# Patient Record
Sex: FEMALE | Race: BLACK OR AFRICAN AMERICAN | NOT HISPANIC OR LATINO | Employment: UNEMPLOYED | URBAN - METROPOLITAN AREA
[De-identification: names, ages, dates, MRNs, and addresses within clinical notes are randomized per-mention and may not be internally consistent; named-entity substitution may affect disease eponyms.]

---

## 2017-08-11 ENCOUNTER — HOSPITAL ENCOUNTER (OUTPATIENT)
Dept: GENERAL RADIOLOGY | Facility: CLINIC | Age: 57
Discharge: HOME | End: 2017-08-11
Attending: EMERGENCY MEDICINE | Admitting: EMERGENCY MEDICINE
Payer: COMMERCIAL

## 2017-08-11 ENCOUNTER — HOSPITAL ENCOUNTER (OUTPATIENT)
Dept: GENERAL RADIOLOGY | Facility: CLINIC | Age: 57
End: 2017-08-11
Attending: INTERNAL MEDICINE
Payer: COMMERCIAL

## 2017-08-11 DIAGNOSIS — R52 PAIN: ICD-10-CM

## 2017-08-11 DIAGNOSIS — M25.532 WRIST PAIN, LEFT: ICD-10-CM

## 2017-08-11 PROCEDURE — 73120 X-RAY EXAM OF HAND: CPT | Mod: LT

## 2017-08-11 PROCEDURE — 73110 X-RAY EXAM OF WRIST: CPT | Mod: LT

## 2017-10-21 ENCOUNTER — HOSPITAL ENCOUNTER (EMERGENCY)
Facility: CLINIC | Age: 57
Discharge: HOME | End: 2017-10-21
Attending: EMERGENCY MEDICINE | Admitting: EMERGENCY MEDICINE
Payer: COMMERCIAL

## 2017-10-21 VITALS
DIASTOLIC BLOOD PRESSURE: 65 MMHG | SYSTOLIC BLOOD PRESSURE: 125 MMHG | OXYGEN SATURATION: 98 % | RESPIRATION RATE: 18 BRPM | TEMPERATURE: 97.4 F

## 2017-10-21 DIAGNOSIS — B02.9 HERPES ZOSTER WITHOUT COMPLICATION: ICD-10-CM

## 2017-10-21 PROCEDURE — 25000125 ZZHC RX 250: Performed by: EMERGENCY MEDICINE

## 2017-10-21 PROCEDURE — 25000132 ZZH RX MED GY IP 250 OP 250 PS 637: Performed by: EMERGENCY MEDICINE

## 2017-10-21 PROCEDURE — 99284 EMERGENCY DEPT VISIT MOD MDM: CPT | Mod: Z6 | Performed by: EMERGENCY MEDICINE

## 2017-10-21 PROCEDURE — 99283 EMERGENCY DEPT VISIT LOW MDM: CPT

## 2017-10-21 RX ORDER — ACYCLOVIR 800 MG/1
800 TABLET ORAL
Qty: 34 TABLET | Refills: 0 | Status: SHIPPED | OUTPATIENT
Start: 2017-10-21 | End: 2023-08-19

## 2017-10-21 RX ORDER — PREDNISONE 20 MG/1
40 TABLET ORAL DAILY
Qty: 8 TABLET | Refills: 0 | Status: SHIPPED | OUTPATIENT
Start: 2017-10-21 | End: 2017-10-25

## 2017-10-21 RX ORDER — PREDNISONE 20 MG/1
40 TABLET ORAL ONCE
Status: COMPLETED | OUTPATIENT
Start: 2017-10-21 | End: 2017-10-21

## 2017-10-21 RX ORDER — ACYCLOVIR 800 MG/1
800 TABLET ORAL ONCE
Status: COMPLETED | OUTPATIENT
Start: 2017-10-21 | End: 2017-10-21

## 2017-10-21 RX ADMIN — ACYCLOVIR 800 MG: 800 TABLET ORAL at 20:48

## 2017-10-21 RX ADMIN — PREDNISONE 40 MG: 20 TABLET ORAL at 20:48

## 2017-10-21 ASSESSMENT — ENCOUNTER SYMPTOMS
FEVER: 0
SHORTNESS OF BREATH: 0
ABDOMINAL PAIN: 0
CHILLS: 0
HEADACHES: 0

## 2017-10-21 NOTE — LETTER
To Whom it may concern:      Diane Chamberlain was seen in our Emergency Department today, 10/21/17.  She may return to work on October 23, 2017.    Sincerely,        Deja yKle MD

## 2017-10-21 NOTE — ED AVS SNAPSHOT
[facility], Emergency Department    [address]    [place] [address]    Phone:  [phone]    Fax:  [phone]                                       Diane Chamberlain   MRN: [id number]    Department:  [facility], Emergency Department   Date of Visit:  10/21/2017           Patient Information     Date Of Birth          1960        Your diagnoses for this visit were:     Herpes zoster without complication        You were seen by Deja Kyle MD.        Discharge Instructions       Please make an appointment to follow up with [facility] (phone: [phone]) in 2-5 days if you have any concerns.    Shingles  Shingles is a viral infection caused by the same virus as chicken pox. Anyone who has had chicken pox may get shingles later in life. The virus stays in the body, but remains dormant (asleep). Shingles often occurs in older persons or persons with lowered immunity. But it can affect anyone at any age.  Shingles starts as a tingling patch of skin on one side of the body. Small, painful blisters may then appear. The rash does not spread to the rest of the body.  Exposure to shingles cannot cause shingles. However, it can cause chicken pox in anyone who has not had chicken pox or has not been vaccinated. The contagious period ends when all blisters have crusted over (generally about 2 weeks after the illness begins).  After the blisters heal, the affected skin may be sensitive or painful for months (neuralgia). This often gradually goes away.  A shingles vaccine is available. This can help prevent shingles or make it less painful. It is generally recommended for adults over the age of 60 who have had chicken pox in the past, but who have never had shingles. Adults over 60 who have had neither chicken pox nor shingles can prevent both diseases with the chicken pox vaccine. Ask your healthcare provider about these vaccines.  Home care    Medicines may be prescribed to help  relieve pain. Take these medicines as directed. Ask your healthcare provider or pharmacist before using over-the-counter medicines for helping treat pain and itching.    In certain cases, antiviral medicines may be prescribed to reduce pain, shorten the illness, and prevent neuralgia. Take these medicines as directed.    Compresses made from a solution of cool water mixed with cornstarch or baking soda may help relieve pain and itching.     Gently wash skin daily with soap and water to help prevent infection.  Be certain to rinse off all of the soap, which can be irritating.    Trim fingernails and try not to scratch. Scratching the sores may leave scars.    Stay home from work or school until all blisters have formed a crust and you are no longer contagious.  Follow-up care  Follow up with your healthcare provider or as directed by our staff.  When to seek medical advice    Fever of 100.4 F (38 C) or higher, or as directed by your healthcare provider    Affected skin is on the face or neck and any of the following occur:    Headache    Eye pain    Changes in vision    Sores near the eye    Weakness of facial muscles    Pain, redness, or swelling of a joint    Signs of skin infection: colored drainage from the sores, warmth, increasing redness, or increasing pain  Date Last Reviewed: 9/25/2015 2000-2017 [employer]. [address] [place] [address]. All rights reserved. This information is not intended as a substitute for professional medical care. Always follow your healthcare professional's instructions.            24 Hour Appointment Hotline       To make an appointment at any [facility], call [phone] ([phone]). If you don't have a family doctor or clinic, we will help you find one. [facility] are conveniently located to serve the needs of you and your family.             Review of your medicines      START taking        Dose / Directions Last dose taken     acyclovir 800 MG tablet   Commonly known as:  ZOVIRAX   Dose:  800 mg   Indication:  Infection caused by the Varicella Zoster Virus   Quantity:  34 tablet        Take 1 tablet (800 mg) by mouth 5 times daily for 7 days   Refills:  0        predniSONE 20 MG tablet   Commonly known as:  DELTASONE   Dose:  40 mg   Quantity:  8 tablet        Take 2 tablets (40 mg) by mouth daily for 4 days   Refills:  0          Our records show that you are taking the medicines listed below. If these are incorrect, please call your family doctor or clinic.        Dose / Directions Last dose taken    ADVIL PO        Take by mouth daily as needed for moderate pain   Refills:  0        cyclobenzaprine 10 MG tablet   Commonly known as:  FLEXERIL   Dose:  5-10 mg   Quantity:  30 tablet        Take 0.5-1 tablets (5-10 mg) by mouth 3 times daily as needed for muscle spasms   Refills:  1        oxyCODONE 5 MG IR tablet   Commonly known as:  ROXICODONE   Dose:  5 mg   Quantity:  20 tablet        Take 1 tablet (5 mg) by mouth every 6 hours as needed for pain   Refills:  0                Prescriptions were sent or printed at these locations (2 Prescriptions)                   Other Prescriptions                Printed at Department/Unit printer (2 of 2)         acyclovir (ZOVIRAX) 800 MG tablet               predniSONE (DELTASONE) 20 MG tablet                Orders Needing Specimen Collection     None      Pending Results     No orders found from 10/19/2017 to 10/22/2017.            Pending Culture Results     No orders found from 10/19/2017 to 10/22/2017.            Pending Results Instructions     If you had any lab results that were not finalized at the time of your Discharge, you can call the ED Lab Result RN at [phone]. You will be contacted by this team for any positive Lab results or changes in treatment. The nurses are available 7 days a week from 10A to 6:30P.  You can leave a message 24 hours per day and they will return your  "call.        Thank you for choosing [facility]       Thank you for choosing [facility] for your care. Our goal is always to provide you with excellent care. Hearing back from our patients is one way we can continue to improve our services. Please take a few minutes to complete the written survey that you may receive in the mail after you visit with us. Thank you!        [facility] Information     [employer] lets you send messages to your doctor, view your test results, renew your prescriptions, schedule appointments and more. To sign up, go to [email or web address] . Click on \"Log in\" on the left side of the screen, which will take you to the Welcome page. Then click on \"Sign up Now\" on the right side of the page.     You will be asked to enter the access code listed below, as well as some personal information. Please follow the directions to create your username and password.     Your access code is: [email or web address]  Expires: 2018  8:27 PM     Your access code will  in 90 days. If you need help or a new code, please call your [facility] clinic or [phone].        Care EveryWhere ID     This is your Care EveryWhere ID. This could be used by other organizations to access your [facility] medical records  [id number]        Equal Access to Services     MUKESH AGUAYO : Marcio platto Sogilbert, waaxda luqadaha, qaybta kaalmada adeegyada, cristobal villeda. So [facility] [phone].    ATENCIÓN: Si habla español, tiene a harding disposición servicios gratuitos de asistencia lingüística. Llame al [phone].    We comply with applicable federal civil rights laws and [place] laws. We do not discriminate on the basis of race, color, national origin, age, disability, sex, sexual orientation, or gender identity.            After Visit Summary       This is your record. Keep this with you and show to your community pharmacist(s) and doctor(s) at your next visit.                  "

## 2017-10-22 NOTE — ED PROVIDER NOTES
History     Chief Complaint   Patient presents with     L upper extremity pain     L arm pain, radiating to armpit.      HPI  Diane Chamberlain is a 57 year old female who presents to the ED with rash in LUE and back. Patient states on Wednesday she first noticed a rash her left arm and since then several more spots have appeared. She states the rash is itchy; she denies pain.  She reports she also has a similar rash on the left side of her upper back.  No fevers.    PAST MEDICAL HISTORY  History reviewed. No pertinent past medical history.  PAST SURGICAL HISTORY  Past Surgical History:   Procedure Laterality Date      SECTION       FAMILY HISTORY  No family history on file.  SOCIAL HISTORY  Social History   Substance Use Topics     Smoking status: Never Smoker     Smokeless tobacco: Never Used     Alcohol use No     MEDICATIONS  No current facility-administered medications for this encounter.      Current Outpatient Prescriptions   Medication     Ibuprofen (ADVIL PO)     acyclovir (ZOVIRAX) 800 MG tablet     predniSONE (DELTASONE) 20 MG tablet     cyclobenzaprine (FLEXERIL) 10 MG tablet     oxyCODONE (ROXICODONE) 5 MG immediate release tablet     ALLERGIES  No Known Allergies    I have reviewed the Medications, Allergies, Past Medical and Surgical History, and Social History in the Epic system.    Review of Systems   Constitutional: Negative for chills and fever.   Respiratory: Negative for shortness of breath.    Cardiovascular: Negative for chest pain.   Gastrointestinal: Negative for abdominal pain.   Skin: Positive for rash.   Allergic/Immunologic: Negative for immunocompromised state.   Neurological: Negative for headaches.   All other systems reviewed and are negative.      Physical Exam   BP: 128/64  Heart Rate: 65  Temp: 97.4  F (36.3  C)  Resp: 18  SpO2: 99 %      Physical Exam   Constitutional: She is oriented to person, place, and time. She appears well-developed and well-nourished. No distress.    HENT:   Head: Normocephalic and atraumatic.   Mouth/Throat: Oropharynx is clear and moist. No oropharyngeal exudate.   Eyes: Conjunctivae and EOM are normal.   Neck: Normal range of motion. Neck supple.   Cardiovascular: Normal rate.    Pulmonary/Chest: Effort normal. No respiratory distress.   Musculoskeletal: Normal range of motion. She exhibits no edema, tenderness or deformity.   Lymphadenopathy:     She has no cervical adenopathy.   Neurological: She is alert and oriented to person, place, and time. No cranial nerve deficit. She exhibits normal muscle tone. Coordination normal.   Skin: Skin is warm. Rash noted. She is not diaphoretic. There is erythema.   Vesicular rash following T1/T2 dermatome starting in the upper back spreading distally on the medial, left upper extremity.   Psychiatric: She has a normal mood and affect. Her behavior is normal. Judgment and thought content normal.   Nursing note and vitals reviewed.      ED Course     ED Course     Procedures   8:09 PM  The patient was seen and examined by Dr. Kyle in Room 4.             Critical Care time:  none             Labs Ordered and Resulted from Time of ED Arrival Up to the Time of Departure from the ED - No data to display         Assessments & Plan (with Medical Decision Making)   This is a 57 year old female presenting with rash. Differential diagnosis: shingles,  molluscum contagiosum, viral illness.    After thorough history and physical examination, the patient appears to be in no acute distress. Her rash is contained in T1/T2 dermatome. This is most likely consistent with zoster. She ll be treated with oral acyclovir and oral prednisone. She ll be discharged with a prescription for the same, and she ll follow with primary care clinic. If she has any further concerns or if her symptoms worsen, she was instructed to return to the Emergency Department.     This part of the medical record was transcribed by Shanthi Lau Scribe,  from a dictation done by Deja Kyle MD.        I have reviewed the nursing notes.    I have reviewed the findings, diagnosis, plan and need for follow up with the patient.    Discharge Medication List as of 10/21/2017  8:27 PM      START taking these medications    Details   acyclovir (ZOVIRAX) 800 MG tablet Take 1 tablet (800 mg) by mouth 5 times daily for 7 days, Disp-34 tablet, R-0, Local PrintFirst dose given in the emergency department on October 21, 2017 at 8:30 PM.      predniSONE (DELTASONE) 20 MG tablet Take 2 tablets (40 mg) by mouth daily for 4 days, Disp-8 tablet, R-0, Local Print             Final diagnoses:   Herpes zoster without complication     I, Toney Gusman, am serving as a trained medical scribe to document services personally performed by Deja Kyle MD, based on the provider's statements to me.      IDeja MD, was physically present and have reviewed and verified the accuracy of this note documented by Toney Gusman.     10/21/2017   [facility], [place], EMERGENCY DEPARTMENT     Deja Kyle MD  10/21/17 0707

## 2017-10-22 NOTE — DISCHARGE INSTRUCTIONS
Please make an appointment to follow up with [facility] (phone: [phone]) in 2-5 days if you have any concerns.    Shingles  Shingles is a viral infection caused by the same virus as chicken pox. Anyone who has had chicken pox may get shingles later in life. The virus stays in the body, but remains dormant (asleep). Shingles often occurs in older persons or persons with lowered immunity. But it can affect anyone at any age.  Shingles starts as a tingling patch of skin on one side of the body. Small, painful blisters may then appear. The rash does not spread to the rest of the body.  Exposure to shingles cannot cause shingles. However, it can cause chicken pox in anyone who has not had chicken pox or has not been vaccinated. The contagious period ends when all blisters have crusted over (generally about 2 weeks after the illness begins).  After the blisters heal, the affected skin may be sensitive or painful for months (neuralgia). This often gradually goes away.  A shingles vaccine is available. This can help prevent shingles or make it less painful. It is generally recommended for adults over the age of 60 who have had chicken pox in the past, but who have never had shingles. Adults over 60 who have had neither chicken pox nor shingles can prevent both diseases with the chicken pox vaccine. Ask your healthcare provider about these vaccines.  Home care    Medicines may be prescribed to help relieve pain. Take these medicines as directed. Ask your healthcare provider or pharmacist before using over-the-counter medicines for helping treat pain and itching.    In certain cases, antiviral medicines may be prescribed to reduce pain, shorten the illness, and prevent neuralgia. Take these medicines as directed.    Compresses made from a solution of cool water mixed with cornstarch or baking soda may help relieve pain and itching.     Gently wash skin daily with soap and water to help prevent  infection.  Be certain to rinse off all of the soap, which can be irritating.    Trim fingernails and try not to scratch. Scratching the sores may leave scars.    Stay home from work or school until all blisters have formed a crust and you are no longer contagious.  Follow-up care  Follow up with your healthcare provider or as directed by our staff.  When to seek medical advice    Fever of 100.4 F (38 C) or higher, or as directed by your healthcare provider    Affected skin is on the face or neck and any of the following occur:    Headache    Eye pain    Changes in vision    Sores near the eye    Weakness of facial muscles    Pain, redness, or swelling of a joint    Signs of skin infection: colored drainage from the sores, warmth, increasing redness, or increasing pain  Date Last Reviewed: 9/25/2015 2000-2017 [employer]. [address] [place] [address]. All rights reserved. This information is not intended as a substitute for professional medical care. Always follow your healthcare professional's instructions.

## 2018-12-22 ENCOUNTER — HOSPITAL ENCOUNTER (EMERGENCY)
Facility: CLINIC | Age: 58
Discharge: HOME | End: 2018-12-22
Attending: FAMILY MEDICINE | Admitting: FAMILY MEDICINE
Payer: COMMERCIAL

## 2018-12-22 VITALS
TEMPERATURE: 97.8 F | OXYGEN SATURATION: 99 % | HEART RATE: 67 BPM | DIASTOLIC BLOOD PRESSURE: 57 MMHG | RESPIRATION RATE: 16 BRPM | WEIGHT: 214.3 LBS | SYSTOLIC BLOOD PRESSURE: 114 MMHG | BODY MASS INDEX: 39.43 KG/M2 | HEIGHT: 62 IN

## 2018-12-22 DIAGNOSIS — L29.9 ITCHING: ICD-10-CM

## 2018-12-22 PROCEDURE — 99282 EMERGENCY DEPT VISIT SF MDM: CPT | Mod: Z6 | Performed by: FAMILY MEDICINE

## 2018-12-22 PROCEDURE — 99282 EMERGENCY DEPT VISIT SF MDM: CPT | Performed by: FAMILY MEDICINE

## 2018-12-22 ASSESSMENT — MIFFLIN-ST. JEOR: SCORE: 1505.31

## 2018-12-22 NOTE — DISCHARGE INSTRUCTIONS
Use a mild facial soap such as Ivory Soap or Dove Soap to wash your face.  Avoid all facial creams and cosmetics. If you want for dry skin- you may use Eucerin or Lubriderm.  For the itch, you may try benadryl orally. You may try 25 mg every 6 hours. Benadryl(diphenhydramine) is over the counter medication and does not need a prescription. The generic works well.  You may follow up at your local medical clinic if not better

## 2018-12-22 NOTE — ED AVS SNAPSHOT
[facility], Emergency Department  [address]  [facility] [address]  Phone:  [phone]  Fax:  [phone]                                    Diane Chamberlain   MRN: [id number]    Department:  [facility], Emergency Department   Date of Visit:  12/22/2018           After Visit Summary Signature Page    I have received my discharge instructions, and my questions have been answered. I have discussed any challenges I see with this plan with the nurse or doctor.    ..........................................................................................................................................  Patient/Patient Representative Signature      ..........................................................................................................................................  Patient Representative Print Name and Relationship to Patient    ..................................................               ................................................  Date                                   Time    ..........................................................................................................................................  Reviewed by Signature/Title    ...................................................              ..............................................  Date                                               Time          22EPIC Rev 08/18

## 2018-12-25 ASSESSMENT — ENCOUNTER SYMPTOMS
SORE THROAT: 0
FATIGUE: 0
HEMATOLOGIC/LYMPHATIC NEGATIVE: 1
CHILLS: 0
COLOR CHANGE: 0
MYALGIAS: 0
FEVER: 0

## 2018-12-25 NOTE — ED PROVIDER NOTES
"  History     Chief Complaint   Patient presents with     Pruritis     Thinks she has allergies.  eyes and ears feel itchy for about 1 weeks.  Face swollen for 2 weeks.  Has not taken any benadryl.     HPI  Diane Chamberlain is a 58 year old female who has no medical ongoing problems. Her stated age is only 47 not 58. She has had itching of the face and neck for the last 2 weeks. She is here for evaluation.  No rash just pruritis. No f or c. No n or v. Denies pregnancy. She does not believe she has used any new or different creams  I have reviewed the Medications, Allergies, Past Medical and Surgical History, and Social History in the Epic system.  No street drugs or medications. No prescription medications.  Review of Systems   Constitutional: Negative for chills, fatigue and fever.   HENT: Negative for congestion and sore throat.    Genitourinary:        Denies preg   Musculoskeletal: Negative for myalgias.   Skin: Negative for color change and rash.   Allergic/Immunologic: Negative for immunocompromised state.   Hematological: Negative.        Physical Exam   BP: 114/57  Pulse: 67  Temp: 97.8  F (36.6  C)  Resp: 16  Height: 157.5 cm (5' 2\")  Weight: 97.2 kg (214 lb 4.8 oz)  SpO2: 99 %      Physical Exam   Constitutional: She appears well-developed and well-nourished. No distress.   Eyes: No scleral icterus.   No scleral icterus   Cardiovascular: Normal rate and regular rhythm.   Pulmonary/Chest: No respiratory distress.   Lymphadenopathy:     She has no cervical adenopathy.   Skin: Skin is warm and dry. No rash noted. She is not diaphoretic. No erythema. No pallor.   Nursing note and vitals reviewed.      ED Course        Procedures        Facial itching. With no rash. No itching other than face. Suspect something topical is the cause  Labs Ordered and Resulted from Time of ED Arrival Up to the Time of Departure from the ED - No data to display         Assessments & Plan (with Medical Decision Making)       I have " reviewed the nursing notes.    I have reviewed the findings, diagnosis, plan and need for follow up with the patient.       Medication List      There are no discharge medications for this visit.         Final diagnoses:   Itching - of face for 2 weeks       12/22/2018   [facility], [place], EMERGENCY DEPARTMENT     Sebastian Callaway MD  12/25/18 8730

## 2019-05-19 ENCOUNTER — HOSPITAL ENCOUNTER (EMERGENCY)
Facility: CLINIC | Age: 59
Discharge: HOME | End: 2019-05-19
Attending: EMERGENCY MEDICINE | Admitting: EMERGENCY MEDICINE
Payer: COMMERCIAL

## 2019-05-19 VITALS
RESPIRATION RATE: 14 BRPM | WEIGHT: 207 LBS | SYSTOLIC BLOOD PRESSURE: 124 MMHG | DIASTOLIC BLOOD PRESSURE: 70 MMHG | OXYGEN SATURATION: 100 % | BODY MASS INDEX: 37.86 KG/M2 | HEART RATE: 74 BPM

## 2019-05-19 DIAGNOSIS — S09.90XA CLOSED HEAD INJURY, INITIAL ENCOUNTER: ICD-10-CM

## 2019-05-19 PROCEDURE — 99282 EMERGENCY DEPT VISIT SF MDM: CPT | Mod: Z6 | Performed by: EMERGENCY MEDICINE

## 2019-05-19 PROCEDURE — 99282 EMERGENCY DEPT VISIT SF MDM: CPT | Performed by: EMERGENCY MEDICINE

## 2019-05-19 RX ORDER — ACETAMINOPHEN 500 MG
1000 TABLET ORAL EVERY 6 HOURS PRN
Qty: 32 TABLET | Refills: 0 | Status: SHIPPED | OUTPATIENT
Start: 2019-05-19 | End: 2019-05-23

## 2019-05-19 ASSESSMENT — ENCOUNTER SYMPTOMS
WEAKNESS: 0
NECK PAIN: 0
VOMITING: 0
LIGHT-HEADEDNESS: 0
NUMBNESS: 0
DIZZINESS: 0
HEADACHES: 1
NAUSEA: 0

## 2019-05-19 NOTE — LETTER
May 19, 2019      To Whom It May Concern:      Diane Chamberlain was seen in our Emergency Department today, 05/19/19.  She may return to work tomorrow.     Sincerely,            Eva Drummond MD

## 2019-05-19 NOTE — ED PROVIDER NOTES
History     Chief Complaint   Patient presents with     Head Injury     yesterday, pt fell at her job and hit left forehead     The history is provided by the patient. The history is limited by a language barrier. A  was used (E[place]ian iPad ).     Diane Chamberlain is a 59 year old Egyptian female who presents to the Emergency Department for evaluation of an occupational head injury following a mechanical fall two days ago. [place] and states that while there two day ago she tripped and fell forward subsequently striking her left forehead against the floor. She denies any loss of consciousness following this, and denies feeling light-headed or dizzy prior to the fall. Furthermore, patient states that several hours following the initial injury, she sustained a repeat injury to the same location while she was sweeping and the broom fell and struck her forehead. Patient currently complains of a severe headache so much so that she was unable to return to work today. She denies sustaining any other injuries related to either of these events. She has attempted icing the site but otherwise denies any additional over-the-counter therapies. She denies any nausea, emesis, numbness, paresthesias, weakness, vision changes, neck pain, or other complaints. She does report feeling dizzy when walking.  She denies previous injuries of the same area. Of note, patient is currently fasting for Ramadan. She denies any current chance of pregnancy, and states that she has not menstruated in over two months.        I have reviewed the Medications, Allergies, Past Medical and Surgical History, and Social History in the Epic system.    No past medical history on file.    Past Surgical History:   Procedure Laterality Date      SECTION         No family history on file.    Social History     Tobacco Use     Smoking status: Never Smoker     Smokeless tobacco: Never  Used   Substance Use Topics     Alcohol use: No     No current facility-administered medications for this encounter.      Current Outpatient Medications   Medication     acyclovir (ZOVIRAX) 800 MG tablet     cyclobenzaprine (FLEXERIL) 10 MG tablet     Ibuprofen (ADVIL PO)     oxyCODONE (ROXICODONE) 5 MG immediate release tablet     UNKNOWN TO PATIENT      No Known Allergies    Review of Systems   Eyes: Negative for visual disturbance.   Gastrointestinal: Negative for nausea and vomiting.   Musculoskeletal: Negative for gait problem and neck pain.   Neurological: Positive for headaches. Negative for dizziness, syncope, weakness, light-headedness and numbness.   All other systems reviewed and are negative.      Physical Exam        /70   Pulse 74   Resp 14   Wt 93.9 kg (207 lb)   SpO2 100%   BMI 37.86 kg/m      Physical Exam  General: patient is alert and oriented, moaning and uncomfortable appearing  Head: left frontal scalp hematoma   EENT: moist mucus membranes without tonsillar erythema or exudates, pupils 3mm equal round and reactive, EOMI, no hemotympanum bilaterally  Neck: supple with full range of motion, no midline cervical spine tenderness to palpation  Cardiovascular: regular rate and rhythm, extremities warm and well perfused, no lower extremity edema  Pulmonary: No respiratory distress   musculoskeletal: normal range of motion of the extremities  Neurological: alert and oriented, moving all extremities symmetrically, CN II-XII intact, strength 5/5 and symmetric in , hip flexion/extension, knee flexion/extension and ankle plantar/dorsiflexion, sensation to light touch in distal upper and lower extremities intact, normal gait  Skin: warm, dry     ED Course        Procedures             Critical Care time:  none             Labs Ordered and Resulted from Time of ED Arrival Up to the Time of Departure from the ED - No data to display         Assessments & Plan (with Medical Decision Making)    This is a 59 year old female who appears younger than her stated age and presents to the Emergency Department following a mechanical fall and head injury. The patient reports mechanical fall two days ago at which time she struck her forehead with no loss of consciousness. She subsequently had a second injury when a broom fell and hit her head at the same location. Currently she is neurovascularly intact but does have palpable scalp hematoma on exam. She does report her headache is progressively worsening and very severe at this time.  She is currently fasting for Ramadan and has declined any oral treatment for her headache. Head CT ordered however patient then reported she is fasting and does not want to do the CT and wants to go home and come back later.  We discussed that the CT would not involve any fluids or break her fast however she is continued to decline.  She is aware of the risks of not completing including but not limited to fracture, ICH, other morbidity and mortality.  She does have capacity to make the decision.  Encouraged to return to the emergency department at any time and also instructed on red flag symptoms to be aware of at home and to immediately return if she should have any worsening symptoms.    This part of the medical record was transcribed by Gaye Gonzalez, Medical Scribe, from a dictation done by Eva Drummond MD.       I have reviewed the nursing notes.    I have reviewed the findings, diagnosis, plan and need for follow up with the patient.       Medication List      Started    acetaminophen 500 MG tablet  Commonly known as:  TYLENOL  1,000 mg, Oral, EVERY 6 HOURS PRN            Final diagnoses:   Closed head injury, initial encounter     IGaye, am serving as a trained medical scribe to document services personally performed by Eva Pickens MD, based on the provider's statements to me.   I, Eva Pickens MD, was physically present and have reviewed and verified the accuracy of  this note documented by Gaye Gonzalez.    5/19/2019   [facility], [place], EMERGENCY DEPARTMENT     Eva Drummond MD  05/19/19 5828

## 2019-05-19 NOTE — ED NOTES
Pt does not want to get a CT at this time. Have spoken to the pt with the .The MD has spoken to the pt. Pt will be DC'd

## 2019-05-19 NOTE — ED AVS SNAPSHOT
[facility], [place], Emergency Department  [address]  [facility] [address]  Phone:  [phone]  Fax:  [phone]                                    Diane Chamberlain   MRN: [id number]    Department:  [facility], Emergency Department   Date of Visit:  5/19/2019           After Visit Summary Signature Page    I have received my discharge instructions, and my questions have been answered. I have discussed any challenges I see with this plan with the nurse or doctor.    ..........................................................................................................................................  Patient/Patient Representative Signature      ..........................................................................................................................................  Patient Representative Print Name and Relationship to Patient    ..................................................               ................................................  Date                                   Time    ..........................................................................................................................................  Reviewed by Signature/Title    ...................................................              ..............................................  Date                                               Time          22EPIC Rev 08/18

## 2019-05-19 NOTE — DISCHARGE INSTRUCTIONS
You were seen in the emergency department for a head injury.  It was recommended that you have a CT of the head to rule out injury but you have declined.  The risks of this include but are not limited to: skull fracture, bleeding inside your head, stroke, death.  You may return at any time for re-evaluation.

## 2019-08-02 ENCOUNTER — HOSPITAL ENCOUNTER (OUTPATIENT)
Dept: CT IMAGING | Facility: CLINIC | Age: 59
Discharge: HOME | End: 2019-08-02
Attending: NURSE PRACTITIONER | Admitting: NURSE PRACTITIONER
Payer: COMMERCIAL

## 2019-08-02 DIAGNOSIS — R51.9 ACUTE NONINTRACTABLE HEADACHE, UNSPECIFIED HEADACHE TYPE: ICD-10-CM

## 2019-08-02 PROCEDURE — 70450 CT HEAD/BRAIN W/O DYE: CPT

## 2021-06-05 ENCOUNTER — APPOINTMENT (OUTPATIENT)
Dept: GENERAL RADIOLOGY | Facility: CLINIC | Age: 61
End: 2021-06-05
Attending: EMERGENCY MEDICINE
Payer: COMMERCIAL

## 2021-06-05 ENCOUNTER — HOSPITAL ENCOUNTER (EMERGENCY)
Facility: CLINIC | Age: 61
Discharge: HOME | End: 2021-06-05
Attending: EMERGENCY MEDICINE | Admitting: EMERGENCY MEDICINE
Payer: COMMERCIAL

## 2021-06-05 VITALS
HEART RATE: 73 BPM | SYSTOLIC BLOOD PRESSURE: 143 MMHG | RESPIRATION RATE: 16 BRPM | TEMPERATURE: 97.1 F | DIASTOLIC BLOOD PRESSURE: 70 MMHG | OXYGEN SATURATION: 98 %

## 2021-06-05 DIAGNOSIS — S96.911A STRAIN OF RIGHT FOOT, INITIAL ENCOUNTER: ICD-10-CM

## 2021-06-05 PROCEDURE — 250N000013 HC RX MED GY IP 250 OP 250 PS 637: Performed by: EMERGENCY MEDICINE

## 2021-06-05 PROCEDURE — 99283 EMERGENCY DEPT VISIT LOW MDM: CPT | Performed by: EMERGENCY MEDICINE

## 2021-06-05 PROCEDURE — 99284 EMERGENCY DEPT VISIT MOD MDM: CPT | Performed by: EMERGENCY MEDICINE

## 2021-06-05 PROCEDURE — 73630 X-RAY EXAM OF FOOT: CPT | Mod: RT

## 2021-06-05 RX ORDER — IBUPROFEN 600 MG/1
600 TABLET, FILM COATED ORAL ONCE
Status: COMPLETED | OUTPATIENT
Start: 2021-06-05 | End: 2021-06-05

## 2021-06-05 RX ADMIN — IBUPROFEN 600 MG: 600 TABLET, FILM COATED ORAL at 13:59

## 2021-06-05 ASSESSMENT — ENCOUNTER SYMPTOMS
CONFUSION: 0
DIFFICULTY URINATING: 0
SHORTNESS OF BREATH: 0
HEADACHES: 0
FEVER: 0
ABDOMINAL PAIN: 0
NECK STIFFNESS: 0
EYE REDNESS: 0
ARTHRALGIAS: 0
COLOR CHANGE: 0

## 2021-06-05 NOTE — ED PROVIDER NOTES
[facility] EMERGENCY DEPARTMENT ([facility])  21     History     Chief Complaint   Patient presents with     Foot Pain     Right foot pain after falling yesterday      The history is provided by the patient and medical records.     Diane Chamberlain is a 61 year old female with no significant past medical history who presents here to the Emergency Department due to right foot pain.  Patient reports yesterday she tripped and fell in her house causing her to hyperextend her right foot.  She now endorses pain and swelling on the top of her right foot.  She is still able to walk on her foot.  She states she has taken some ibuprofen for her pain.  Patient denies other injury.    Past Medical History  No past medical history on file.  Past Surgical History:   Procedure Laterality Date      SECTION       acyclovir (ZOVIRAX) 800 MG tablet  cyclobenzaprine (FLEXERIL) 10 MG tablet  oxyCODONE (ROXICODONE) 5 MG immediate release tablet  UNKNOWN TO PATIENT      No Known Allergies  Family History  No family history on file.  Social History   Social History     Tobacco Use     Smoking status: Never Smoker     Smokeless tobacco: Never Used   Substance Use Topics     Alcohol use: No     Drug use: No      Past medical history, past surgical history, medications, allergies, family history, and social history were reviewed with the patient. No additional pertinent items.       Review of Systems   Constitutional: Negative for fever.   HENT: Negative for congestion.    Eyes: Negative for redness.   Respiratory: Negative for shortness of breath.    Cardiovascular: Negative for chest pain.   Gastrointestinal: Negative for abdominal pain.   Genitourinary: Negative for difficulty urinating.   Musculoskeletal: Negative for arthralgias and neck stiffness.        Pos for anterior right foot pain   Skin: Negative for color change.   Neurological: Negative for headaches.   Psychiatric/Behavioral: Negative for confusion.   All  other systems reviewed and are negative.    A complete review of systems was performed with pertinent positives and negatives noted in the HPI, and all other systems negative.    Physical Exam   BP: (!) 143/70  Pulse: 73  Temp: 97.1  F (36.2  C)  Resp: 16  SpO2: 98 %  Physical Exam  Constitutional:       General: She is not in acute distress.     Appearance: She is not diaphoretic.   HENT:      Head: Atraumatic.      Mouth/Throat:      Pharynx: No oropharyngeal exudate.   Eyes:      General: No scleral icterus.     Pupils: Pupils are equal, round, and reactive to light.   Cardiovascular:      Heart sounds: Normal heart sounds.   Pulmonary:      Effort: No respiratory distress.      Breath sounds: Normal breath sounds.   Abdominal:      General: Bowel sounds are normal.      Palpations: Abdomen is soft.      Tenderness: There is no abdominal tenderness.   Musculoskeletal:      Right foot: Tenderness and bony tenderness present.        Feet:    Skin:     General: Skin is warm.      Findings: No rash.         ED Course     1:49 PM  The patient was seen and examined by Reuben Obando MD in Room ED06.    Procedures        The medical record was reviewed and interpreted.  Current images reviewed and interpreted: No obvious fractures.       Results for orders placed or performed during the hospital encounter of 06/05/21   Foot  XR, G/E 3 views, right     Status: None    Narrative    EXAM: XR FOOT RIGHT G/E 3 VIEWS  LOCATION: [facility]  DATE/TIME: 6/5/2021 2:39 PM    INDICATION: Injury, pain  COMPARISON: None.      Impression    IMPRESSION: Normal joint spaces and alignment. There is a tiny calcification or cortical bone fragment adjacent distal tip of the distal phalanx of the fifth toe which is age indeterminate but more likely chronic provided there is no associated   tenderness. No other fracture. Mild plantar calcaneal spur.     Medications   ibuprofen (ADVIL/MOTRIN) tablet 600 mg (600 mg Oral Given  6/5/21 2007)        Assessments & Plan (with Medical Decision Making)   61-year-old female presents for evaluation of right foot pain following injury.  Differential includes fracture, sprain, Lisfranc, contusion, metatarsalgia, plantar fasciitis.  Exam reveals tenderness over the second through fourth metatarsals without significant bruising or swelling.  X-ray reveals no acute bony abnormalities.  Patient will be discharged with postop boot, ibuprofen and instructions to follow-up with a primary physician in the next 1 to 2 weeks if her symptoms have not fully resolved.    I have reviewed the nursing notes. I have reviewed the findings, diagnosis, plan and need for follow up with the patient.    New Prescriptions    No medications on file       Final diagnoses:   Strain of right foot, initial encounter   I, Kassy Solis, am serving as a trained medical scribe to document services personally performed by Reuben Obando MD, based on the provider's statements to me.     I, Reuben Obando MD, was physically present and have reviewed and verified the accuracy of this note documented by Kassy Solis.     --  Reuben Obando MD  [facility] EMERGENCY DEPARTMENT  6/5/2021     Reuben Obando MD  06/05/21 2537

## 2021-06-05 NOTE — ED NOTES
This RN went to discharge patient and take vital signs, patient was not found in room. Both bathrooms were checked and patient was not found in either bathrooms. Upon going to the front lobby, registration stated they had let the patient out as the patient said she was discharged. Unable to contact patient by phone at this time to go over discharge paperwork.

## 2021-06-08 RX ORDER — IBUPROFEN 600 MG/1
600 TABLET, FILM COATED ORAL EVERY 6 HOURS PRN
Qty: 20 TABLET | Refills: 0 | Status: SHIPPED | OUTPATIENT
Start: 2021-06-08 | End: 2023-08-19

## 2021-06-08 NOTE — ED NOTES
Pt calling due to not receiving prescription for ibuprofen. Dr. Obando informed and will send prescription to [facility].

## 2021-07-14 ENCOUNTER — APPOINTMENT (OUTPATIENT)
Dept: GENERAL RADIOLOGY | Facility: CLINIC | Age: 61
End: 2021-07-14
Attending: EMERGENCY MEDICINE
Payer: COMMERCIAL

## 2021-07-14 ENCOUNTER — HOSPITAL ENCOUNTER (EMERGENCY)
Facility: CLINIC | Age: 61
Discharge: HOME | End: 2021-07-14
Attending: EMERGENCY MEDICINE | Admitting: EMERGENCY MEDICINE
Payer: COMMERCIAL

## 2021-07-14 VITALS
HEART RATE: 85 BPM | RESPIRATION RATE: 16 BRPM | SYSTOLIC BLOOD PRESSURE: 138 MMHG | TEMPERATURE: 97.9 F | DIASTOLIC BLOOD PRESSURE: 77 MMHG | OXYGEN SATURATION: 99 %

## 2021-07-14 DIAGNOSIS — M79.671 RIGHT FOOT PAIN: ICD-10-CM

## 2021-07-14 PROCEDURE — 99283 EMERGENCY DEPT VISIT LOW MDM: CPT | Performed by: EMERGENCY MEDICINE

## 2021-07-14 PROCEDURE — 99284 EMERGENCY DEPT VISIT MOD MDM: CPT | Performed by: EMERGENCY MEDICINE

## 2021-07-14 PROCEDURE — 73630 X-RAY EXAM OF FOOT: CPT | Mod: RT

## 2021-07-14 RX ORDER — NAPROXEN 500 MG/1
500 TABLET ORAL 2 TIMES DAILY PRN
Qty: 24 TABLET | Refills: 0 | Status: SHIPPED | OUTPATIENT
Start: 2021-07-14 | End: 2023-08-19

## 2021-07-14 ASSESSMENT — ENCOUNTER SYMPTOMS: ABDOMINAL PAIN: 0

## 2021-07-14 NOTE — ED PROVIDER NOTES
[facility] EMERGENCY DEPARTMENT ([facility])  21  History     Chief Complaint   Patient presents with     Foot Pain     Tripped about 3 weeks ago, hyperextended right foot, came to ED at that time and was given a boot. Pt reports pain is still there and wants to be seen again.      The history is provided by the patient and medical records. A  was used (Family member speaker phone Grenadian ).     Diane Chamberlain is a 61 year old female with a past medical history significant for primary osteoarthritis of right knee, GERD who presents to the Emergency Department for evaluation of right foot pain.  Patient reports that she tripped approximately 3 weeks ago (was actually ) and hyperextended her right foot.  She states that she came to the ED at that time and was given a boot.  Patient reports that the pain is causing her to not sleep at night. SHe doesn't like wearing the boot that she was given because it has been hot ourside, so she has not been wearing it.  She is instead wearing a hard soled shoe. Patient reports that the pain is overlying the distal 2nd and 3rd metatarsals on both the dorsal and plantar aspect of the foot.  Patient reports that it hurts more when she walks and puts weight on it.  She states that she feels heat on her foot.  She denies any new injury or trauma.  Patient reports that she takes ibuprofen for the pain. Patient denies chest pain, abdominal pain.  No numbness or weakness of the toes or the foot.  She has no other complaints today.      Past Medical History  No past medical history on file.  Past Surgical History:   Procedure Laterality Date      SECTION       naproxen (NAPROSYN) 500 MG tablet  acyclovir (ZOVIRAX) 800 MG tablet  cyclobenzaprine (FLEXERIL) 10 MG tablet  ibuprofen (ADVIL/MOTRIN) 600 MG tablet  oxyCODONE (ROXICODONE) 5 MG immediate release tablet  UNKNOWN TO PATIENT      No Known Allergies  Family History  No family  history on file.  Social History   Social History     Tobacco Use     Smoking status: Never Smoker     Smokeless tobacco: Never Used   Substance Use Topics     Alcohol use: No     Drug use: No      Past medical history, past surgical history, medications, allergies, family history, and social history were reviewed with the patient. No additional pertinent items.       Review of Systems   Cardiovascular: Negative for chest pain.   Gastrointestinal: Negative for abdominal pain.   Musculoskeletal:        Positive for right foot pain   All other systems reviewed and are negative.      Physical Exam   BP: (!) 141/73  Pulse: 80  Temp: 97.5  F (36.4  C)  Resp: 16  SpO2: 98 %  Physical Exam    GEN:  Alert, well developed, no acute distress  HEENT:  PERRL, EOMI, Mucous membranes are moist.   Musculoskeletal: The right foot and toes of the right foot have normal range of motion, there is no swelling, ecchymosis, erythema or focal tenderness.  Normal distal capillary refill, normal dorsalis pedis pulse on the right foot.  No tenderness in the ankle.  No lower extremity swelling or calf tenderness  Neuro:  Alert and oriented X3, Follows commands, moving all extremities spontaneously, normal strength and sensation throughout the right foot and ankle, normal strength with dorsiflexion and plantarflexion of the toes of the right foot.  Skin:  Warm, dry   ED Course     3:17 PM  The patient was seen and examined by Jazmine Schmitt MD in Room ED06.   Procedures    Repeat x-ray of the right foot was reviewed by me and results are shown below.     Results for orders placed or performed during the hospital encounter of 07/14/21   Foot  XR, G/E 3 views, right     Status: None (Preliminary result)    Narrative    FOOT RIGHT THREE OR MORE VIEWS   7/14/2021 4:26 PM    HISTORY: Foot pain after injury 1 month ago.    COMPARISON: 6/5/2021       Impression    IMPRESSION:  No acute change. No new right foot fracture or dislocation.  Unchanged  mild right great toe MTP joint osteoarthritis. Tiny bone fragment seen  at the distal tip of the right small toe distal phalanx, unchanged.  Heel spur.      Medications - No data to display     Assessments & Plan (with Medical Decision Making)   Patient presents with continued pain in her right foot after she hyperflexed it about a month ago.  She stopped wearing the boot and is wearing a hard soled shoe.  I advised that she go back to wearing the boot because then this will put less pressure on, less use of the extensor tendons in the foot.  Patient was also given a note with walking and standing restrictions at work.  She was advised to try taking naproxen instead of ibuprofen and was advised to follow-up with her primary care provider in 1 to 2 weeks for continued care and reevaluation.  There is no sign of neurovascular compromise or any sign of infection.    I have reviewed the nursing notes. I have reviewed the findings, diagnosis, plan and need for follow up with the patient.    Discharge Medication List as of 7/14/2021  5:11 PM      START taking these medications    Details   naproxen (NAPROSYN) 500 MG tablet Take 1 tablet (500 mg) by mouth 2 times daily as needed for moderate pain, Disp-24 tablet, R-0, Local Print             Final diagnoses:   Right foot pain     I, Allyson Huff, am serving as a trained medical scribe to document services personally performed by Jazmine Schmitt MD, based on the provider's statements to me.     I, Jazmine Schmitt MD, was physically present and have reviewed and verified the accuracy of this note documented by Allyson Huff.    --  Jazmine Schmitt MD  [facility] EMERGENCY DEPARTMENT  7/14/2021     Jazmine Schmitt MD  07/14/21 9162

## 2021-07-14 NOTE — LETTER
July 14, 2021      To Whom It May Concern:      Diane Chamberlain was seen in our Emergency Department today, 07/14/21.  She may return to work/school tomorrow, but will need to restrict standing to 10-15 minutes at a time, restrict walking to 2 blocks at a time and minimize stair climbing for the foot to heal. She will need to follow up with her primary care provider for re-evaluation in 1-2 weeks.     Sincerely,        Jazmine Schmitt MD

## 2021-07-14 NOTE — DISCHARGE INSTRUCTIONS
Please wear the boot that was given to you when you are discharged for your foot injury last month.  This will prevent further pain.  Please return the emergency department if you have numbness or weakness in the foot, redness, increased pain or swelling, any other concerns.    Please make an appointment to follow up with Your Primary Care Provider in 7 days for reevaluation and to determine further work restrictions or to determine your ability to return to normal working activities.

## 2021-07-19 ENCOUNTER — MEDICAL CORRESPONDENCE (OUTPATIENT)
Dept: HEALTH INFORMATION MANAGEMENT | Facility: CLINIC | Age: 61
End: 2021-07-19

## 2021-07-23 NOTE — TELEPHONE ENCOUNTER
DIAGNOSIS: ER Follow Up:  RIGHT foot / Reuben Obando, Emergency Medicine @ [facility] / Imaging ON FILE   APPOINTMENT DATE: 7.30.21   NOTES STATUS DETAILS   OFFICE NOTE from referring provider Internal 7.14.21 Oskar JEMAL    OFFICE NOTE from other specialist N/A    DISCHARGE SUMMARY from hospital N/A    DISCHARGE REPORT from the ER Internal 7.14.21 MILLI Schmitt  6.5.21 [facility]   OPERATIVE REPORT N/A    EMG report N/A    MEDICATION LIST Internal    MRI N/A    DEXA (osteoporosis/bone health) N/A    CT SCAN N/A    XRAYS (IMAGES & REPORTS) Internal 7.14.21 R foot  6.5.21 R foot

## 2021-07-28 ENCOUNTER — TRANSCRIBE ORDERS (OUTPATIENT)
Dept: OTHER | Age: 61
End: 2021-07-28

## 2021-07-28 DIAGNOSIS — M79.671 RIGHT FOOT PAIN: Primary | ICD-10-CM

## 2021-07-30 ENCOUNTER — PRE VISIT (OUTPATIENT)
Dept: ORTHOPEDICS | Facility: CLINIC | Age: 61
End: 2021-07-30

## 2021-08-25 ENCOUNTER — OFFICE VISIT (OUTPATIENT)
Dept: ORTHOPEDICS | Facility: CLINIC | Age: 61
End: 2021-08-25
Payer: COMMERCIAL

## 2021-08-25 DIAGNOSIS — M79.671 FOOT PAIN, RIGHT: Primary | ICD-10-CM

## 2021-08-25 PROCEDURE — 99203 OFFICE O/P NEW LOW 30 MIN: CPT | Performed by: FAMILY MEDICINE

## 2021-08-25 NOTE — PROGRESS NOTES
Subjective:   Diane Chamberlain is a 61 year old female who presents in clinic for evaluation of right foot along 1st and 5th metatarsal and along all 5 metatarsal heads. She is wearing a surgical shoe in clinic today.   Pt reports she had an injury, tripped on a rug  Landed face down  Toes, under the foot under toes  Had x-rays in  and July with small bone fragment  If gets out of postop shoe it's more painful  No past foot injuries  Severe pain, worse 2nd and 3rd metatarsal heads  No back pain, sometimes numbness into right foot  Active at her job, sometimes walks at home  No PT done  Pain decreases somewhat with Naproxen  [place] [employer], hard to land off the van  Wants time off from work    Background:   Date of injury: 21   Duration of symptoms: 2.5 months  Mechanism of Injury: Acute; Activity Related, fell in her home causing toes and foot to hyperextend.   Intensity: 5/10 at rest, 8/10 with activity   Aggravating factors: weight-bearing activities, wearing closed toe shoes.   Relieving Factors: rest, Advil and naproxen.   Prior Evaluation: Prior Physician Evalutation: 21 at ED and X-rays 21 and 21    PAST MEDICAL, SOCIAL, SURGICAL AND FAMILY HISTORY: She  has no past medical history on file.  She  has a past surgical history that includes  section.  Her family history is not on file.  She reports that she has never smoked. She has never used smokeless tobacco. She reports that she does not drink alcohol and does not use drugs.    ALLERGIES: She has No Known Allergies.    CURRENT MEDICATIONS: She has a current medication list which includes the following prescription(s): acyclovir, cyclobenzaprine, ibuprofen, naproxen, oxycodone, and UNKNOWN TO PATIENT.     REVIEW OF SYSTEMS: 10 point review of systems is negative except as noted above.     Exam:   There were no vitals taken for this visit.           CONSTITUTIONAL: alert, moderate distress, cooperative and over  weight  HEAD: Normocephalic. No masses, lesions, tenderness or abnormalities  SKIN: no suspicious lesions or rashes  GAIT: antalgic  NEUROLOGIC: Non-focal  PSYCHIATRIC: affect normal/bright    MUSCULOSKELETAL: right foot pain    ANKLE  Inspection/Palpation:     Swelling: no swelling      Non-tender: ATFL, CFL, PTFL, medial malleolus, deltoid ligament, anterior tib-fib ligament, achilles  tendon, no achilles tendon defect   Range of Motion: dorsiflexion: full, plantarflexion: full, inversion: full, eversion: full  Strength:dorsiflexion: 5/5, plantarflexion: 5/5, inversion: 5/5, eversion: 5/5   Special tests: negative anterior drawer, negative varus stress, negative valgus stress, negative forced external rotation, negative Anderson sign     FOOT  Inspection/Palpation:      Swelling: no swelling  Tender capsules of 2nd and 3rd MTP     Non-tender: promixal 5th metatarsal, 1st, 4th, 5th metatarsals, calcaneous, cuboid,  navicular, cuneiform lateral, cuneiform middle, cuneiform medial, metatarsal heads, peroneal tendon: at lateral malleolus, at cuboid, at proximal 5th metatarsal, posterior tibial tendon at medial malleolus, posterior tibial tendon at navicular, plantar fascia  Range of Motion: flexion of toes: full, extension of toes: full         Assessment/Plan:   Pt is a 60 yo Slovak female with PMhx of no medical issues presenting with right foot pain    1.  Right foot second and third metatarsal capsulitis-  Super feet with metatarsal pads  Patient can use this in regular shoes and stop using the postop boot on the right foot  This will be used to calm the area  Ice  Patient also sent to physical therapy as she has been using a postop shoe for 2-1/2 months  Patient reports that it is difficult to do her job because when she steps down from a higher step she is putting increased pressure on the right foot  I did give a letter reporting that she was receiving orthotics and that physical therapy was recommended however  I feel that the patient should have a discussion regarding further time off with her employer    RTC prn, if no improvement s/p PT  X-RAY INTERPRETATION:   Right foot reviewed x-ray- mild MTP OA, tiny bone fragment distal tip of right small toe distal phalanx

## 2021-08-25 NOTE — LETTER
2021      RE: Diane Chamberlain  [address]  [facility] [address]        Subjective:   Diane Chamberlain is a 61 year old female who presents in clinic for evaluation of right foot along 1st and 5th metatarsal and along all 5 metatarsal heads. She is wearing a surgical shoe in clinic today.   Pt reports she had an injury, tripped on a rug  Landed face down  Toes, under the foot under toes  Had x-rays in  and July with small bone fragment  If gets out of postop shoe it's more painful  No past foot injuries  Severe pain, worse 2nd and 3rd metatarsal heads  No back pain, sometimes numbness into right foot  Active at her job, sometimes walks at home  No PT done  Pain decreases somewhat with Naproxen  [place] [employer], hard to land off the van  Wants time off from work    Background:   Date of injury: 21   Duration of symptoms: 2.5 months  Mechanism of Injury: Acute; Activity Related, fell in her home causing toes and foot to hyperextend.   Intensity: 5/10 at rest, 8/10 with activity   Aggravating factors: weight-bearing activities, wearing closed toe shoes.   Relieving Factors: rest, Advil and naproxen.   Prior Evaluation: Prior Physician Evalutation: 21 at ED and X-rays 21 and 21    PAST MEDICAL, SOCIAL, SURGICAL AND FAMILY HISTORY: She  has no past medical history on file.  She  has a past surgical history that includes  section.  Her family history is not on file.  She reports that she has never smoked. She has never used smokeless tobacco. She reports that she does not drink alcohol and does not use drugs.    ALLERGIES: She has No Known Allergies.    CURRENT MEDICATIONS: She has a current medication list which includes the following prescription(s): acyclovir, cyclobenzaprine, ibuprofen, naproxen, oxycodone, and UNKNOWN TO PATIENT.     REVIEW OF SYSTEMS: 10 point review of systems is negative except as noted above.     Exam:   There were no vitals taken for  this visit.           CONSTITUTIONAL: alert, moderate distress, cooperative and over weight  HEAD: Normocephalic. No masses, lesions, tenderness or abnormalities  SKIN: no suspicious lesions or rashes  GAIT: antalgic  NEUROLOGIC: Non-focal  PSYCHIATRIC: affect normal/bright    MUSCULOSKELETAL: right foot pain    ANKLE  Inspection/Palpation:     Swelling: no swelling      Non-tender: ATFL, CFL, PTFL, medial malleolus, deltoid ligament, anterior tib-fib ligament, achilles  tendon, no achilles tendon defect   Range of Motion: dorsiflexion: full, plantarflexion: full, inversion: full, eversion: full  Strength:dorsiflexion: 5/5, plantarflexion: 5/5, inversion: 5/5, eversion: 5/5   Special tests: negative anterior drawer, negative varus stress, negative valgus stress, negative forced external rotation, negative Anderson sign     FOOT  Inspection/Palpation:      Swelling: no swelling  Tender capsules of 2nd and 3rd MTP     Non-tender: promixal 5th metatarsal, 1st, 4th, 5th metatarsals, calcaneous, cuboid,  navicular, cuneiform lateral, cuneiform middle, cuneiform medial, metatarsal heads, peroneal tendon: at lateral malleolus, at cuboid, at proximal 5th metatarsal, posterior tibial tendon at medial malleolus, posterior tibial tendon at navicular, plantar fascia  Range of Motion: flexion of toes: full, extension of toes: full         Assessment/Plan:   Pt is a 62 yo Turkmen female with PMhx of no medical issues presenting with right foot pain    1.  Right foot second and third metatarsal capsulitis-  Super feet with metatarsal pads  Patient can use this in regular shoes and stop using the postop boot on the right foot  This will be used to calm the area  Ice  Patient also sent to physical therapy as she has been using a postop shoe for 2-1/2 months  Patient reports that it is difficult to do her job because when she steps down from a higher step she is putting increased pressure on the right foot  I did give a letter  reporting that she was receiving orthotics and that physical therapy was recommended however I feel that the patient should have a discussion regarding further time off with her employer    RTC prn, if no improvement s/p PT  X-RAY INTERPRETATION:   Right foot reviewed x-ray- mild MTP OA, tiny bone fragment distal tip of right small toe distal phalanx        Ghazal Cueto MD

## 2021-08-25 NOTE — LETTER
August 25, 2021    RE:Diane Chamberlain  [address]  [facility] [address]    1960            Dear Ms. Chamberlain      To Whom It May Concern:    Diane Chamberlain is under my professional care for Foot pain, right.   She  may return to work on or about 8/30/21 with the following: Pt will receive orthotics and a referral for physical therapy.  Pt will need to discuss time off with employer.        Sincerely,      Ghazal Cueto MD

## 2022-02-06 NOTE — ED AVS SNAPSHOT
[facility], Emergency Department    [address]    [facility] [address]    Phone:  [phone]    Fax:  [phone]                                       Diane Chamberlain   MRN: [id number]    Department:  [facility], Emergency Department   Date of Visit:  10/21/2017           After Visit Summary Signature Page     I have received my discharge instructions, and my questions have been answered. I have discussed any challenges I see with this plan with the nurse or doctor.    ..........................................................................................................................................  Patient/Patient Representative Signature      ..........................................................................................................................................  Patient Representative Print Name and Relationship to Patient    ..................................................               ................................................  Date                                            Time    ..........................................................................................................................................  Reviewed by Signature/Title    ...................................................              ..............................................  Date                                                            Time           Continued Stay Note  [facility]     Patient Name: Eddy Ferro  MRN: [id number]  Today's Date: 2/6/2022    Admit Date: 1/11/2022     Discharge Plan     Row Name 02/06/22 1537       Plan    Plan Eventual LTAC placement    Plan Comments Rec'd a referral that pt's family is asking about long term care at [facility]. Spoke with Ayse [phone] and she said that they are not sure they want [facility]. They want to check into the one at [facility]. Was not aware that [facility] had a LTAC but she says her family member worked there and it was for sure a LTAC. Spoke with Martha, unit sw, to verify if she knew of one and the one in [place] [facility] is actually [facility] which is the one they had already been looking into. Called Ayse back to leave message to inform.               Discharge Codes    No documentation.                     RICKEY Meraz

## 2022-10-22 ENCOUNTER — HOSPITAL ENCOUNTER (EMERGENCY)
Facility: CLINIC | Age: 62
Discharge: HOME | End: 2022-10-22
Attending: EMERGENCY MEDICINE | Admitting: EMERGENCY MEDICINE
Payer: COMMERCIAL

## 2022-10-22 VITALS
HEIGHT: 62 IN | HEART RATE: 77 BPM | BODY MASS INDEX: 41.42 KG/M2 | TEMPERATURE: 97.7 F | OXYGEN SATURATION: 99 % | DIASTOLIC BLOOD PRESSURE: 78 MMHG | WEIGHT: 225.1 LBS | SYSTOLIC BLOOD PRESSURE: 122 MMHG | RESPIRATION RATE: 18 BRPM

## 2022-10-22 DIAGNOSIS — H10.31 ACUTE BACTERIAL CONJUNCTIVITIS OF RIGHT EYE: ICD-10-CM

## 2022-10-22 PROCEDURE — 99282 EMERGENCY DEPT VISIT SF MDM: CPT | Performed by: EMERGENCY MEDICINE

## 2022-10-22 PROCEDURE — 99284 EMERGENCY DEPT VISIT MOD MDM: CPT | Performed by: EMERGENCY MEDICINE

## 2022-10-22 RX ORDER — ERYTHROMYCIN 5 MG/G
0.5 OINTMENT OPHTHALMIC 4 TIMES DAILY
Qty: 3.5 G | Refills: 0 | Status: SHIPPED | OUTPATIENT
Start: 2022-10-22 | End: 2022-10-29

## 2022-10-22 RX ORDER — PROPARACAINE HYDROCHLORIDE 5 MG/ML
1 SOLUTION/ DROPS OPHTHALMIC ONCE
Status: DISCONTINUED | OUTPATIENT
Start: 2022-10-22 | End: 2022-10-22 | Stop reason: HOSPADM

## 2022-10-22 RX ORDER — ERYTHROMYCIN 5 MG/G
OINTMENT OPHTHALMIC ONCE
Status: DISCONTINUED | OUTPATIENT
Start: 2022-10-22 | End: 2022-10-22 | Stop reason: HOSPADM

## 2022-10-22 ASSESSMENT — ENCOUNTER SYMPTOMS
FATIGUE: 0
EYE REDNESS: 1
CONSTIPATION: 0
EYE PAIN: 1
FREQUENCY: 0
WEAKNESS: 0
DIZZINESS: 0
PALPITATIONS: 0
COUGH: 0
CHILLS: 0
CONFUSION: 0
MYALGIAS: 0
ABDOMINAL PAIN: 0
NECK PAIN: 0
HEADACHES: 0
DYSURIA: 0
SORE THROAT: 0
SHORTNESS OF BREATH: 0
DIARRHEA: 0
ARTHRALGIAS: 0
CHEST TIGHTNESS: 0
DIFFICULTY URINATING: 0
VOMITING: 0
COLOR CHANGE: 0
FEVER: 0
NAUSEA: 0
EYE ITCHING: 1
BACK PAIN: 0
ABDOMINAL DISTENTION: 0

## 2022-10-22 ASSESSMENT — VISUAL ACUITY
OU: 1
OS: 20/20
OD: 20/20

## 2022-10-22 ASSESSMENT — TONOMETRY
IOP_HANDHELD: 1
OS_IOP_MMHG: 15
OD_IOP_MMHG: 13

## 2022-10-22 NOTE — DISCHARGE INSTRUCTIONS
Your symptoms are likely caused by an infection called bacterial conjunctivitis.  I started you on an antibiotic ointment called erythromycin.  Be sure to apply this 4 times per day.  If you run out of the ointment, a prescription has been provided to you.  Follow-up with your doctor in 5 to 7 days for reassessment of your symptoms.  Return to the ED with any new or worsening symptoms.

## 2022-10-22 NOTE — LETTER
October 22, 2022      To Whom It May Concern:      Diane Chamberlain was seen in our Emergency Department today, 10/22/22.  I expect her condition to improve over the next 1-2 days.  She may return to work/school when improved.    Sincerely,        Fito Oconnor DO

## 2022-10-22 NOTE — ED TRIAGE NOTES
Patient states with  that she is feeling eye pain, itchy, and red. She points to her right eye. She states it started yesterday.      Triage Assessment     Row Name 10/22/22 1103       Triage Assessment (Adult)    Airway WDL WDL       Respiratory WDL    Respiratory WDL WDL       Skin Circulation/Temperature WDL    Skin Circulation/Temperature WDL WDL       Cardiac WDL    Cardiac WDL WDL       Peripheral/Neurovascular WDL    Peripheral Neurovascular WDL WDL       Cognitive/Neuro/Behavioral WDL    Cognitive/Neuro/Behavioral WDL WDL

## 2022-10-22 NOTE — ED PROVIDER NOTES
[facility] [place] [facility]    10/22/22      ED20    History     Chief Complaint   Patient presents with     Eye Pain     Patient is having right eye pain that is also itchy/red that started yesterday.      The history is provided by the patient and medical records. The history is limited by a language barrier. A  was used (Chilean  (ID**)).     Diane Chamberlain is a 62 year old female with history of seasonal allergies who presents to the Emergency Department for evaluation of eye pain. Patient was seen at [facility] on 10/21/22, diagnosed with seasonal allergies after complaints of itchy eyes, and prescribed Claritin (10 mg) and 0.1 % Patanol. Patient reports right eye pain began yesterday (10/22/22). She denies any visual disturbance and endorses having an itchy, red right eye. Denies injury to eye.     Epic Records reviewed.    Past Medical History  No past medical history on file.  Past Surgical History:   Procedure Laterality Date      SECTION       erythromycin (ROMYCIN) 5 MG/GM ophthalmic ointment  acyclovir (ZOVIRAX) 800 MG tablet  cyclobenzaprine (FLEXERIL) 10 MG tablet  ibuprofen (ADVIL/MOTRIN) 600 MG tablet  naproxen (NAPROSYN) 500 MG tablet  oxyCODONE (ROXICODONE) 5 MG immediate release tablet  UNKNOWN TO PATIENT      No Known Allergies  Family History  No family history on file.  Social History   Social History     Tobacco Use     Smoking status: Never     Smokeless tobacco: Never   Substance Use Topics     Alcohol use: No     Drug use: No      Past medical history, past surgical history, medications, allergies, family history, and social history were reviewed with the patient. No additional pertinent items.       Review of Systems   Constitutional: Negative for chills, fatigue and fever.   HENT: Negative for congestion and sore throat.    Eyes: Positive for pain, redness and itching. Negative for visual disturbance.  "  Respiratory: Negative for cough, chest tightness and shortness of breath.    Cardiovascular: Negative for chest pain and palpitations.   Gastrointestinal: Negative for abdominal distention, abdominal pain, constipation, diarrhea, nausea and vomiting.   Genitourinary: Negative for difficulty urinating, dysuria, frequency and urgency.   Musculoskeletal: Negative for arthralgias, back pain, myalgias and neck pain.   Skin: Negative for color change and rash.   Neurological: Negative for dizziness, weakness and headaches.   Psychiatric/Behavioral: Negative for confusion.     A complete review of systems was performed with pertinent positives and negatives noted in the HPI, and all other systems negative.    Physical Exam   BP: 122/78  Pulse: 77  Temp: 97.7  F (36.5  C)  Resp: 18  Height: 157.5 cm (5' 2\")  Weight: 102.1 kg (225 lb 1.6 oz)  SpO2: 99 %  Physical Exam  Vitals and nursing note reviewed.   Constitutional:       General: She is not in acute distress.     Appearance: Normal appearance.   HENT:      Head: Normocephalic.      Nose: Nose normal.   Eyes:      General: Lids are normal. Lids are everted, no foreign bodies appreciated. Vision grossly intact. Gaze aligned appropriately. No visual field deficit.        Right eye: Discharge present.      Intraocular pressure: Right eye pressure is 13 mmHg. Left eye pressure is 15 mmHg. Measurements were taken using a handheld tonometer.     Extraocular Movements: Extraocular movements intact.      Right eye: Normal extraocular motion.      Conjunctiva/sclera:      Right eye: Right conjunctiva is injected. No chemosis, exudate or hemorrhage.     Left eye: Left conjunctiva is not injected. No chemosis, exudate or hemorrhage.     Pupils: Pupils are equal, round, and reactive to light.      Funduscopic exam:     Right eye: No hemorrhage, exudate or papilledema.      Visual Fields: Right eye visual fields normal.      Comments: Injected right sclera.  Erythema of the " conjunctive a.  No pupillary defect.  No visual field deficit   Cardiovascular:      Rate and Rhythm: Normal rate and regular rhythm.   Pulmonary:      Effort: Pulmonary effort is normal.   Abdominal:      General: There is no distension.   Musculoskeletal:         General: No deformity. Normal range of motion.      Cervical back: Normal range of motion.   Skin:     General: Skin is warm.   Neurological:      Mental Status: She is alert and oriented to person, place, and time.   Psychiatric:         Mood and Affect: Mood normal.         ED Course     ED Course as of 10/22/22 1214   Sat Oct 22, 2022   1131 BP: 122/78   1131 Temp: 97.7  F (36.5  C)   1131 Pulse: 77   1131 Resp: 18   1131 SpO2: 99 %   1131 Weight: 102.1 kg (225 lb 1.6 oz)   1135 Patient presents the ED for evaluation of right eye pain.   1136 Differential diagnosis includes conjunctivitis/blepharitis, acute angle-closure glaucoma, iritis, corneal abrasion, corneal ulcer   1209 On examination.  There is no papilledema.  Otherwise normal funduscopic exam.  No visual field deficits.  20/20 in both eyes.  Fluorescein/Woods lamp examination without evidence of corneal abrasion.  No pupillary defect.  Pressure is 13 in the right eye and 15 in the left eye.  There is some erythema of the conjunctive a and injected sclera, consistent with conjunctivitis.   1211 Given the reassuring exam, no evidence of acute angle-closure glaucoma, traumatic iritis, globe injury, corneal abrasion, foreign body.  Symptoms consistent with bacterial conjunctivitis.  Erythromycin applied in the ED.  Recommending PCP follow-up.  Educated reasons to return to the ED.     Procedures       The medical record was reviewed and interpreted.  Managed outpatient prescription medications.              No results found for any visits on 10/22/22.  Medications   proparacaine (ALCAINE) 0.5 % ophthalmic solution 1 drop (has no administration in time range)   fluorescein (FUL-PRIMO) ophthalmic  strip 1 strip (has no administration in time range)   erythromycin (ROMYCIN) ophthalmic ointment (has no administration in time range)        Assessments & Plan (with Medical Decision Making)   Acute Right Bacterial conjunctivitis.    Erythromycin ointment applied in the ED.  We will continue 4 times a day for 7 days.  Follow-up with PCP.  Return to ED with new or worsening symptoms.      I have reviewed the nursing notes. I have reviewed the findings, diagnosis, plan and need for follow up with the patient.    New Prescriptions    ERYTHROMYCIN (ROMYCIN) 5 MG/GM OPHTHALMIC OINTMENT    Place 0.5 inches into the right eye 4 times daily for 7 days       Final diagnoses:   Acute bacterial conjunctivitis of right eye     I, ORACIO SOLANO, am serving as a trained medical scribe to document services personally performed by Fito Oconnor DO based on the provider's statements to me on October 22, 2022.  This document has been checked and approved by the attending provider.    IFito DO, was physically present and have reviewed and verified the accuracy of this note documented by ORACIO SOLANO medical scribe.      Fito Oconnor DO  --  Fito Oconnor DO  [facility] EMERGENCY DEPARTMENT  10/22/2022     Fito Oconnor DO  10/22/22 1217

## 2022-12-27 ENCOUNTER — HOSPITAL ENCOUNTER (OUTPATIENT)
Dept: MRI IMAGING | Facility: CLINIC | Age: 62
Discharge: HOME | End: 2022-12-27
Attending: FAMILY MEDICINE | Admitting: FAMILY MEDICINE
Payer: COMMERCIAL

## 2022-12-27 DIAGNOSIS — G50.0 TRIGEMINAL NEURALGIA OF RIGHT SIDE OF FACE: ICD-10-CM

## 2022-12-27 PROCEDURE — 70551 MRI BRAIN STEM W/O DYE: CPT | Mod: 26 | Performed by: RADIOLOGY

## 2022-12-27 PROCEDURE — 70551 MRI BRAIN STEM W/O DYE: CPT

## 2022-12-27 RX ORDER — GADOBUTROL 604.72 MG/ML
10 INJECTION INTRAVENOUS ONCE
Status: DISCONTINUED | OUTPATIENT
Start: 2022-12-27 | End: 2022-12-28 | Stop reason: HOSPADM

## 2023-02-24 ENCOUNTER — TRANSCRIBE ORDERS (OUTPATIENT)
Dept: OTHER | Age: 63
End: 2023-02-24

## 2023-02-24 DIAGNOSIS — M25.511 CHRONIC PAIN IN RIGHT SHOULDER: Primary | ICD-10-CM

## 2023-02-24 DIAGNOSIS — G89.29 CHRONIC PAIN IN RIGHT SHOULDER: Primary | ICD-10-CM

## 2023-08-19 ENCOUNTER — HOSPITAL ENCOUNTER (EMERGENCY)
Facility: CLINIC | Age: 63
Discharge: HOME | End: 2023-08-19
Attending: EMERGENCY MEDICINE | Admitting: EMERGENCY MEDICINE
Payer: COMMERCIAL

## 2023-08-19 ENCOUNTER — APPOINTMENT (OUTPATIENT)
Dept: GENERAL RADIOLOGY | Facility: CLINIC | Age: 63
End: 2023-08-19
Attending: EMERGENCY MEDICINE
Payer: COMMERCIAL

## 2023-08-19 VITALS
OXYGEN SATURATION: 97 % | SYSTOLIC BLOOD PRESSURE: 94 MMHG | BODY MASS INDEX: 40.75 KG/M2 | WEIGHT: 222.8 LBS | HEART RATE: 71 BPM | RESPIRATION RATE: 16 BRPM | TEMPERATURE: 98 F | DIASTOLIC BLOOD PRESSURE: 61 MMHG

## 2023-08-19 DIAGNOSIS — M25.541 ARTHRALGIA OF RIGHT HAND: ICD-10-CM

## 2023-08-19 PROCEDURE — 99283 EMERGENCY DEPT VISIT LOW MDM: CPT | Performed by: EMERGENCY MEDICINE

## 2023-08-19 PROCEDURE — 73130 X-RAY EXAM OF HAND: CPT | Mod: RT

## 2023-08-19 RX ORDER — IBUPROFEN 200 MG
600 TABLET ORAL 3 TIMES DAILY
Qty: 63 TABLET | Refills: 0 | Status: SHIPPED | OUTPATIENT
Start: 2023-08-19 | End: 2023-08-26

## 2023-08-19 RX ORDER — TRAMADOL HYDROCHLORIDE 50 MG/1
50 TABLET ORAL EVERY 6 HOURS PRN
Qty: 10 TABLET | Refills: 0 | Status: SHIPPED | OUTPATIENT
Start: 2023-08-19

## 2023-08-19 ASSESSMENT — ACTIVITIES OF DAILY LIVING (ADL)
ADLS_ACUITY_SCORE: 33
ADLS_ACUITY_SCORE: 35

## 2023-08-19 NOTE — DISCHARGE INSTRUCTIONS
Keep splint clean and dry.    Do not use your right hand for 1 week.    Please make an appointment to follow up with Your Primary Care Provider in 1 week for splint removal and recheck.

## 2023-08-19 NOTE — Clinical Note
Diane Chamberlain was seen and treated in our emergency department on 8/19/2023.  She may return to work on 08/21/2023.  Patient will be unable to use her right hand for 1 week     If you have any questions or concerns, please don't hesitate to call.      Kelvin Middleton MD

## 2023-08-19 NOTE — ED PROVIDER NOTES
[facility] [place] ([facility])    23      ED PROVIDER NOTE   ED 2   History     Chief Complaint   Patient presents with    Hand Pain     Right palm/ finger pain     The history is provided by the patient and medical records.     Diane Chamberlain is a 63 year old [place] female who presents to the Emergency Department with 1 weeks worth of right hand pain that she is identified over her right fourth MCP joint.  Patient states she has no other joints that hurt her and states that this joint is swollen and painful.  Patient denies any other rheumatologic conditions and denies any injury or direct trauma.     Past Medical History  History reviewed. No pertinent past medical history.  Past Surgical History:   Procedure Laterality Date     SECTION       cyclobenzaprine (FLEXERIL) 10 MG tablet  ibuprofen (ADVIL/MOTRIN) 600 MG tablet  oxyCODONE (ROXICODONE) 5 MG immediate release tablet  acyclovir (ZOVIRAX) 800 MG tablet  naproxen (NAPROSYN) 500 MG tablet  UNKNOWN TO PATIENT      No Known Allergies    Family History  History reviewed. No pertinent family history.    Social History   Social History     Tobacco Use    Smoking status: Never    Smokeless tobacco: Never   Substance Use Topics    Alcohol use: No    Drug use: No         A medically appropriate review of systems was performed with pertinent positives and negatives noted in the HPI, and all other systems negative.    Physical Exam   BP: 106/71  Pulse: 79  Temp: 98  F (36.7  C)  Resp: 16  Weight: 101.1 kg (222 lb 12.8 oz)  SpO2: 99 %    Physical Exam  Vitals and nursing note reviewed.   Constitutional:       General: She is not in acute distress.     Appearance: She is not ill-appearing.   HENT:      Head: Atraumatic.   Eyes:      Extraocular Movements: Extraocular movements intact.      Pupils: Pupils are equal, round, and reactive to light.   Musculoskeletal:      Comments: Patient's right hand reveals swelling and tenderness over  the right fourth MCP joint.  Patient has a history of osteoarthritis and this may be just a flare of the osteoarthritis or this may be consistent with gout.   Neurological:      General: No focal deficit present.      Mental Status: She is alert.   Psychiatric:         Mood and Affect: Mood normal.           ED Course, Procedures, & Data      Procedures          Results for orders placed or performed during the hospital encounter of 08/19/23   XR Hand Right G/E 3 Views     Status: None    Narrative    EXAM: XR HAND RIGHT G/E 3 VIEWS  LOCATION: [facility]  DATE: 8/19/2023    INDICATION: Right hand pain  COMPARISON: None.      Impression    IMPRESSION: Mild degenerative arthritis of the first CMC. Otherwise, normal joint spaces and alignment. No fracture.     Patient's right hand was placed in an ulnar gutter splint for her to protect the fourth MCP joint.      Critical care was not performed.     Medical Decision Making  The patient's presentation was of low complexity (an acute and uncomplicated illness or injury).    The patient's evaluation involved:  review of external note(s) from 1 sources (epic records)  ordering and/or review of 1 test(s) in this encounter (see above)    The patient's management necessitated moderate risk (a decision regarding minor procedure (splint placement) with risk factors of none).    Assessment & Plan      I have reviewed the nursing notes.    I have reviewed the findings, diagnosis, plan and need for follow up with the patient.    New Prescriptions    IBUPROFEN (ADVIL/MOTRIN) 200 MG TABLET    Take 3 tablets (600 mg) by mouth 3 times daily for 7 days    TRAMADOL (ULTRAM) 50 MG TABLET    Take 1 tablet (50 mg) by mouth every 6 hours as needed for moderate to severe pain       Final diagnoses:   Arthralgia of right hand     Keep splint clean and dry.    Do not use your right hand for 1 week.  Work note given    Please make an appointment to  follow up with Your Primary Care Provider in 1 week for splint removal and recheck.    Kelvin Middleton MD    [facility] EMERGENCY DEPARTMENT  8/19/2023     Kelvin Middleton MD  08/19/23 1175

## 2023-08-19 NOTE — Clinical Note
Diane Chamberlain was seen and treated in our emergency department on 8/19/2023.  She may return to work on 08/21/2023.  Patient will be unable to use her right hand for 1 week     If you have any questions or concerns, please don't hesitate to call.      Kelvin Middletno MD

## 2023-08-19 NOTE — ED TRIAGE NOTES
Pt states that she woke up with right palm/ finger pain. Denies fall or any know hand injury     Triage Assessment       Row Name 08/19/23 105       Triage Assessment (Adult)    Airway WDL WDL       Respiratory WDL    Respiratory WDL WDL       Skin Circulation/Temperature WDL    Skin Circulation/Temperature WDL WDL       Cardiac WDL    Cardiac WDL WDL       Peripheral/Neurovascular WDL    Peripheral Neurovascular WDL X  swollen right hand       Cognitive/Neuro/Behavioral WDL    Cognitive/Neuro/Behavioral WDL WDL

## 2023-10-03 ENCOUNTER — HOSPITAL ENCOUNTER (OUTPATIENT)
Dept: GENERAL RADIOLOGY | Facility: CLINIC | Age: 63
Discharge: HOME | End: 2023-10-03
Attending: STUDENT IN AN ORGANIZED HEALTH CARE EDUCATION/TRAINING PROGRAM | Admitting: STUDENT IN AN ORGANIZED HEALTH CARE EDUCATION/TRAINING PROGRAM
Payer: COMMERCIAL

## 2023-10-03 DIAGNOSIS — M25.541 METACARPOPHALANGEAL JOINT PAIN OF RIGHT HAND: ICD-10-CM

## 2023-10-03 PROCEDURE — 73130 X-RAY EXAM OF HAND: CPT | Mod: RT

## 2023-11-29 ENCOUNTER — MEDICAL CORRESPONDENCE (OUTPATIENT)
Dept: HEALTH INFORMATION MANAGEMENT | Facility: CLINIC | Age: 63
End: 2023-11-29
Payer: COMMERCIAL

## 2023-11-30 ENCOUNTER — TRANSCRIBE ORDERS (OUTPATIENT)
Dept: OTHER | Age: 63
End: 2023-11-30

## 2023-11-30 DIAGNOSIS — M25.541 METACARPOPHALANGEAL JOINT PAIN OF RIGHT HAND: Primary | ICD-10-CM

## 2023-12-11 NOTE — TELEPHONE ENCOUNTER
DIAGNOSIS: Metacarpophalangeal joint pain of right hand [M25.541]  - Primary   APPOINTMENT DATE:  12/19/2023   NOTES STATUS DETAILS   OFFICE NOTE from referring provider Care Everywhere 11/29/2023 - Kusum Yoder MD- [facility]   DISCHARGE REPORT from the ER Care Everywhere 08/19/2023 - [facility] ED   MEDICATION LIST Internal  Care Everywhere    XRAYS (IMAGES & REPORTS) Internal PACS

## 2023-12-19 ENCOUNTER — PRE VISIT (OUTPATIENT)
Dept: ORTHOPEDICS | Facility: CLINIC | Age: 63
End: 2023-12-19

## 2023-12-19 ENCOUNTER — OFFICE VISIT (OUTPATIENT)
Dept: ORTHOPEDICS | Facility: CLINIC | Age: 63
End: 2023-12-19
Payer: COMMERCIAL

## 2023-12-19 DIAGNOSIS — M65.341 TRIGGER RING FINGER OF RIGHT HAND: ICD-10-CM

## 2023-12-19 PROCEDURE — 99214 OFFICE O/P EST MOD 30 MIN: CPT | Performed by: STUDENT IN AN ORGANIZED HEALTH CARE EDUCATION/TRAINING PROGRAM

## 2023-12-19 NOTE — LETTER
12/19/2023      RE: Diane Chamberlain  [address]  [place] [address]     Dear Colleague,    Thank you for referring your patient, Diane Chamberlain, to the [facility] SPORTS MEDICINE CLINIC [place]. Please see a copy of my visit note below.    Sports Medicine Clinic           ASSESSMENT and PLAN:     Diane was seen today for pain.    Diagnoses and all orders for this visit:    Trigger ring finger of right hand  Four months of pain on the volar side of her 4th MCP. Consistent with trigger finger with palpable triggering reproducing pain today in clinic. Discussed treatment options including oval 8 splint, CSI and referral for release. Patient would like to try oval 8 splint first and if not improving will plan to do CSI  -     Orthopedic  Referral  -      follow up in 6-8 weeks, plan for trigger CSI if not improved  -     oval 8 splint      Return sooner if develops new or worsening symptoms.    Options for treatment and/or follow-up care were reviewed with the patient was actively involved in the decision making process. Patient verbalized understanding and was in agreement with the plan.    Twyla Draper MD, [facility]  Primary Care Sports Medicine           SUBJECTIVE       Diane Chamberlain is a 63 year old female presenting to clinic today with a chief complaint of right hand pain, referred by Dr. Yoder.     Onset: 4 month(s) ago. Patient describes injury as getting into a van pulling herself up and then started to have pain in her hand.   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    ROS:  As noted above otherwise negative.    There is no problem list on file for this patient.      Current Outpatient Medications   Medication Sig Dispense Refill    traMADol (ULTRAM) 50 MG tablet Take 1 tablet (50 mg) by mouth every 6 hours as needed for moderate to severe pain 10 tablet 0            OBJECTIVE:       Vitals: There were no vitals filed for this visit.  BMI: There is no height or weight on file to calculate BMI.    Gen:  Well nourished and in no acute distress  HEENT: Extraocular movement intact  Neck: Supple  Pulm:  Breathing Comfortably. No increased respiratory effort.  Psych: Euthymic. Appropriately answers questions    MSK:   Slight swelling over the 4th MCP on the volar side. Able to flex and extend actively at the MCP, PIP and DIP, but lacking full flexion into fist. With volar pressure over the MCP able to fully flex and then palpable trigger reproduced.     Imaging was personally reviewed and interpreted by me.   Narrative & Impression   EXAM: XR HAND RIGHT G/E 3 VIEWS  LOCATION: [facility]  DATE: 10/3/2023     INDICATION: Injury 8 18 23 to 4th MCP with persistent pain and edema  COMPARISON: None.         Again, thank you for allowing me to participate in the care of your patient.      Sincerely,    Twyla Draper MD

## 2023-12-19 NOTE — PROGRESS NOTES
Sports Medicine Clinic           ASSESSMENT and PLAN:     Diane was seen today for pain.    Diagnoses and all orders for this visit:    Trigger ring finger of right hand  Four months of pain on the volar side of her 4th MCP. Consistent with trigger finger with palpable triggering reproducing pain today in clinic. Discussed treatment options including oval 8 splint, CSI and referral for release. Patient would like to try oval 8 splint first and if not improving will plan to do CSI  -     Orthopedic  Referral  -      follow up in 6-8 weeks, plan for trigger CSI if not improved  -     oval 8 splint      Return sooner if develops new or worsening symptoms.    Options for treatment and/or follow-up care were reviewed with the patient was actively involved in the decision making process. Patient verbalized understanding and was in agreement with the plan.    Twyla Draper MD, [facility]  Primary Care Sports Medicine           SUBJECTIVE       Diane Chamberlain is a 63 year old female presenting to clinic today with a chief complaint of right hand pain, referred by Dr. Yoder.     Onset: 4 month(s) ago. Patient describes injury as getting into a van pulling herself up and then started to have pain in her hand.   Location of Pain: Right 4th MCP volar side  Rating of Pain at worst: 6/10  Rating of Pain Currently: 1/10  Worsened by: sleep, using hands  Treatments tried: no treatment tried to date  Associated symptoms: no distal numbness or tingling; denies swelling or warmth  Orthopedic history: NO  Relevant surgical history: NO  Social history: social history: works at [employer] [place] [employer]    Pain is the volar side of her 4th MCP     PMH, Medications and Allergies were reviewed and updated as needed.    ROS:  As noted above otherwise negative.    There is no problem list on file for this patient.      Current Outpatient Medications   Medication Sig Dispense Refill    traMADol (ULTRAM) 50 MG tablet Take 1 tablet (50 mg)  by mouth every 6 hours as needed for moderate to severe pain 10 tablet 0            OBJECTIVE:       Vitals: There were no vitals filed for this visit.  BMI: There is no height or weight on file to calculate BMI.    Gen:  Well nourished and in no acute distress  HEENT: Extraocular movement intact  Neck: Supple  Pulm:  Breathing Comfortably. No increased respiratory effort.  Psych: Euthymic. Appropriately answers questions    MSK:   Slight swelling over the 4th MCP on the volar side. Able to flex and extend actively at the MCP, PIP and DIP, but lacking full flexion into fist. With volar pressure over the MCP able to fully flex and then palpable trigger reproduced.     Imaging was personally reviewed and interpreted by me.   Narrative & Impression   EXAM: XR HAND RIGHT G/E 3 VIEWS  LOCATION: [facility]  DATE: 10/3/2023     INDICATION: Injury 8 18 23 to 4th MCP with persistent pain and edema  COMPARISON: None.

## 2023-12-26 ENCOUNTER — APPOINTMENT (OUTPATIENT)
Dept: INTERPRETER SERVICES | Facility: CLINIC | Age: 63
End: 2023-12-26
Payer: COMMERCIAL

## 2023-12-26 ENCOUNTER — TELEPHONE (OUTPATIENT)
Dept: ORTHOPEDICS | Facility: CLINIC | Age: 63
End: 2023-12-26
Payer: COMMERCIAL

## 2023-12-26 NOTE — TELEPHONE ENCOUNTER
Patient is asking for a call from 's care team regarding her brace, and needing a new one.Please call patient at [phone] to discuss her options. Okay to leave detailed msg.

## 2023-12-26 NOTE — TELEPHONE ENCOUNTER
Using  services, I returned the patient's phone call to discuss bracing that she previously received from Dr. Draper. She states that she lost her oval 8 splint while shopping the other day, she would like to  2 more oval 8 splints, I scheduled her for a cast tech appointment on 12/27/23 at 11:20 AM. I confirmed the time and location of the upcoming appointment. All questions were answered at this time. Laine Mcclendon ATC on 12/26/2023 at 10:24 AM

## 2023-12-27 ENCOUNTER — ALLIED HEALTH/NURSE VISIT (OUTPATIENT)
Dept: ORTHOPEDICS | Facility: CLINIC | Age: 63
End: 2023-12-27
Payer: COMMERCIAL

## 2023-12-27 DIAGNOSIS — M65.341 TRIGGER RING FINGER OF RIGHT HAND: Primary | ICD-10-CM

## 2023-12-27 PROCEDURE — 99207 PR NO CHARGE NURSE ONLY: CPT

## 2023-12-27 NOTE — PROGRESS NOTES
Patient was seen today and given 2 size 7 oval 8 braces. She was fit appropriately and educated on how to wear the brace.

## 2024-01-23 ENCOUNTER — HOSPITAL ENCOUNTER (EMERGENCY)
Facility: CLINIC | Age: 64
Discharge: HOME | End: 2024-01-23
Attending: STUDENT IN AN ORGANIZED HEALTH CARE EDUCATION/TRAINING PROGRAM | Admitting: STUDENT IN AN ORGANIZED HEALTH CARE EDUCATION/TRAINING PROGRAM
Payer: COMMERCIAL

## 2024-01-23 VITALS
OXYGEN SATURATION: 98 % | DIASTOLIC BLOOD PRESSURE: 65 MMHG | HEART RATE: 64 BPM | SYSTOLIC BLOOD PRESSURE: 117 MMHG | RESPIRATION RATE: 17 BRPM

## 2024-01-23 DIAGNOSIS — Q38.5 PALATE ABNORMALITY: ICD-10-CM

## 2024-01-23 PROCEDURE — 99283 EMERGENCY DEPT VISIT LOW MDM: CPT | Performed by: STUDENT IN AN ORGANIZED HEALTH CARE EDUCATION/TRAINING PROGRAM

## 2024-01-23 PROCEDURE — 99282 EMERGENCY DEPT VISIT SF MDM: CPT | Performed by: STUDENT IN AN ORGANIZED HEALTH CARE EDUCATION/TRAINING PROGRAM

## 2024-01-23 ASSESSMENT — ACTIVITIES OF DAILY LIVING (ADL): ADLS_ACUITY_SCORE: 33

## 2024-01-23 NOTE — ED PROVIDER NOTES
ED Provider Note  [facility]      History     Chief Complaint   Patient presents with    Mouth Injury     HPI  Diane Chamberlain is a 64 year old female who presents emergency department due to concern for possible foreign body in her palate.    Patient notes that over this last weekend maybe 3 or 4 days ago she was chewing on an old toothbrush.  One of the bristles came off, and she feels as though it dislodged and was in the top of her palate.  She notes while she is sitting still, and not moving she does not have any significant discomfort, but when she breathes deeply she feels a sensation of a foreign body in the top of her roof of her mouth.    Denies any stabbing injury to the her palate, any difficulty breathing, throat pain, fevers or chills.    Past Medical History  No past medical history on file.  Past Surgical History:   Procedure Laterality Date     SECTION       traMADol (ULTRAM) 50 MG tablet      No Known Allergies  Family History  No family history on file.  Social History   Social History     Tobacco Use    Smoking status: Never    Smokeless tobacco: Never   Substance Use Topics    Alcohol use: No    Drug use: No         A medically appropriate review of systems was performed with pertinent positives and negatives noted in the HPI, and all other systems negative.    Physical Exam   BP: 117/65  Pulse: 64  Temp:  (unable to read?)  Resp: 18  SpO2: 98 %  Physical Exam  GEN: Well appearing, non toxic, cooperative  HEENT: normocephalic and atraumatic, PERRLA, EOMI; normal posterior pharynx without any erythema, tenderness or fluctuance.  Roof of her hard palate as well as her soft palate is soft, nontender with no palpable foreign bodies.  No obviously visible foreign bodies either.  Patient points to the area of discomfort on the right upper junction between the hard and soft palate, and despite feeling in this location and looking closely am not able to see anything  visible there  CV: well-perfused, normal skin color for ethnicity  PULM: breathing comfortably, in no respiratory distress  ABD: nondistended  EXT: Full range of motion.  No edema.  NEURO: awake, conversant, grossly normal bilateral upper and lower extremity strength & ROM   SKIN: No rashes, ecchymosis, or lacerations  PSYCH: Calm and cooperative, interactive      ED Course, Procedures, & Data      Procedures   No results found for any visits on 01/23/24.  Medications - No data to display  Labs Ordered and Resulted from Time of ED Arrival to Time of ED Departure - No data to display  No orders to display          Critical care was not performed.     Medical Decision Making  The patient's presentation was of moderate complexity (an acute complicated injury).    The patient's evaluation involved:  an assessment requiring an independent historian (see separate area of note for details)  review of external note(s) from 3+ sources (see separate area of note for details)  review of 3+ test result(s) ordered prior to this encounter (see separate area of note for details)    The patient's management necessitated moderate risk (prescription drug management including medications given in the ED).    Assessment & Plan    64-year-old Lebanese speaking female presented to the emergency department due to concern for possible embedded foreign body in her palate noted to be hemodynamically stable with a story that is not concerning for any significant traumatic injury to her heart or soft palate, with pinpoint discomfort, without any evidence of any significant overlying lesions, palpable foreign bodies, otherwise without any significant swelling.    At this point in time, possibility that she has a small bristle of the toothbrush retained of her palate, however fairly low suspicion at this point.  She has a punctate area of mild erythema which could be a small cut of the palate, but cannot palpate anything at this point to  remove.    Patient already has a follow-up appointment with ENT at the beginning of this month.  Discussed that she should continue to follow-up with this, and in the meantime she can do salt water rinses and drink honey for discomfort.    Did discuss return precautions regarding worsening pain, swelling including possible infectious process around foreign body, and at this point in time very low suspicion of significant palate injury concerning for any underlying carotid injury especially as this is more midline than anything else.  Discussion was had with her son who was present and acted as her .  They felt comfortable with this plan    I have reviewed the nursing notes. I have reviewed the findings, diagnosis, plan and need for follow up with the patient.    New Prescriptions    No medications on file       Final diagnoses:   Palate abnormality       Kristi Carroll MD  [facility] EMERGENCY DEPARTMENT  1/23/2024     Kristi Carroll MD  01/23/24 5944

## 2024-01-23 NOTE — ED TRIAGE NOTES
Broke part of toothbrush off while brushing, broken piece removed and thrown away, still hurts want to make sure no piece is left    Airway unaffected, points to front hard pallet, not able to visualize  anything in triage      Triage Assessment (Adult)       Row Name 01/23/24 0380          Triage Assessment    Airway WDL WDL        Respiratory WDL    Respiratory WDL WDL        Skin Circulation/Temperature WDL    Skin Circulation/Temperature WDL WDL        Cardiac WDL    Cardiac WDL WDL        Peripheral/Neurovascular WDL    Peripheral Neurovascular WDL WDL        Cognitive/Neuro/Behavioral WDL    Cognitive/Neuro/Behavioral WDL WDL

## 2024-01-23 NOTE — DISCHARGE INSTRUCTIONS
You are seen the emergency department due to possible toothbrush bristle in your soft palate of your mouth.  There is no sign at this time that you have a foreign body that can be removed, however we do recommend that you continue to have ongoing discomfort, it is reasonable to see an ENT for reevaluation to see whether or not something can be removed.    In the meantime you can do salt water rinses in your mouth twice a day for discomfort, and you can also drink a spoonful of honey at a time for pain as well.    If you develop significant swelling or redness on the roof of her mouth, fevers, voice changes,, please return to the emergency department for reevaluation.

## 2024-07-20 NOTE — PROGRESS NOTES
Sports Medicine Clinic           ASSESSMENT and PLAN:     Diane was seen today for pain.    Diagnoses and all orders for this visit:    Trigger ring finger of right hand  Persistent trigger finger of the 4th right digit. Has been wearing oval 8 splint occasionally but not as consistently as she could. We discussed treatment options including more consistent oval 8 splint, topical voltaren, CSI or surgical release. She would like to try wearing splint more with topical voltaren but if it doesn't work will plan for CSI in 2 months  - oval 8 splint and topical voltaren  - follow up in 2 months and consider CSI at that point    Return sooner if develops new or worsening symptoms.    Options for treatment and/or follow-up care were reviewed with the patient was actively involved in the decision making process. Patient verbalized understanding and was in agreement with the plan.    Twyla Draper MD, [facility]  Primary Care Sports Medicine           SUBJECTIVE       Diane Chamberlain is a 64 year old female presenting to clinic today for follow up of right ring finger.    Patient was last seen on 12/19/23.    Since their last visit she states that she uses the oval 8 splint when she works, which is beneficial. She does still have pain and swelling in the finger. She would like to hear more about a possible steroid injection.      She is still having some triggering which is painful. She is sometimes sleeping with it but not always. She doesn't tend to wake up with it stuck.     Medications and Allergies were reviewed and updated as needed.    ROS:  As noted above otherwise negative.    There is no problem list on file for this patient.      Current Outpatient Medications   Medication Sig Dispense Refill    traMADol (ULTRAM) 50 MG tablet Take 1 tablet (50 mg) by mouth every 6 hours as needed for moderate to severe pain 10 tablet 0            OBJECTIVE:       Vitals: There were no vitals filed for this visit.  BMI: There is no  height or weight on file to calculate BMI.    Gen:  Well nourished and in no acute distress  HEENT: Extraocular movement intact  Neck: Supple  Pulm:  Breathing Comfortably. No increased respiratory effort.  Psych: Euthymic. Appropriately answers questions    MSK:   Slight swelling over the 4th MCP on the volar side. Able to flex and extend actively at the MCP, PIP and DIP, but lacking full flexion into fist. With volar pressure over the MCP able to fully flex and then palpable trigger reproduced.

## 2024-07-23 ENCOUNTER — OFFICE VISIT (OUTPATIENT)
Dept: ORTHOPEDICS | Facility: CLINIC | Age: 64
End: 2024-07-23
Payer: COMMERCIAL

## 2024-07-23 DIAGNOSIS — M65.341 TRIGGER RING FINGER OF RIGHT HAND: Primary | ICD-10-CM

## 2024-07-23 PROCEDURE — 99213 OFFICE O/P EST LOW 20 MIN: CPT | Performed by: STUDENT IN AN ORGANIZED HEALTH CARE EDUCATION/TRAINING PROGRAM

## 2024-07-23 NOTE — PATIENT INSTRUCTIONS
Buy over the counter diclofenac gel (Voltaren) and apply to the painful area up to four times per day.    Wear splint at all times.    Follow up in 2 months.      Twyla Draper MD, [facility]  Primary Care Sports Medicine

## 2024-07-23 NOTE — LETTER
7/23/2024      RE: Diane Chamberlain  [address]  [facility] [id number]     Dear Colleague,    Thank you for referring your patient, Diane Chamberlain, to the [facility] SPORTS MEDICINE CLINIC [place]. Please see a copy of my visit note below.      Sports Medicine Clinic           ASSESSMENT and PLAN:     Diane was seen today for pain.    Diagnoses and all orders for this visit:    Trigger ring finger of right hand  Persistent trigger finger of the 4th right digit. Has been wearing oval 8 splint occasionally but not as consistently as she could. We discussed treatment options including more consistent oval 8 splint, topical voltaren, CSI or surgical release. She would like to try wearing splint more with topical voltaren but if it doesn't work will plan for CSI in 2 months  - oval 8 splint and topical voltaren  - follow up in 2 months and consider CSI at that point    Return sooner if develops new or worsening symptoms.    Options for treatment and/or follow-up care were reviewed with the patient was actively involved in the decision making process. Patient verbalized understanding and was in agreement with the plan.    Twyla Draper MD, [facility]  Primary Care Sports Medicine           SUBJECTIVE       Diane Chamberlain is a 64 year old female presenting to clinic today for follow up of right ring finger.    Patient was last seen on 12/19/23.    Since their last visit she states that she uses the oval 8 splint when she works, which is beneficial. She does still have pain and swelling in the finger. She would like to hear more about a possible steroid injection.      She is still having some triggering which is painful. She is sometimes sleeping with it but not always. She doesn't tend to wake up with it stuck.     Medications and Allergies were reviewed and updated as needed.    ROS:  As noted above otherwise negative.    There is no problem list on file for this patient.      Current Outpatient  Medications   Medication Sig Dispense Refill     traMADol (ULTRAM) 50 MG tablet Take 1 tablet (50 mg) by mouth every 6 hours as needed for moderate to severe pain 10 tablet 0            OBJECTIVE:       Vitals: There were no vitals filed for this visit.  BMI: There is no height or weight on file to calculate BMI.    Gen:  Well nourished and in no acute distress  HEENT: Extraocular movement intact  Neck: Supple  Pulm:  Breathing Comfortably. No increased respiratory effort.  Psych: Euthymic. Appropriately answers questions    MSK:   Slight swelling over the 4th MCP on the volar side. Able to flex and extend actively at the MCP, PIP and DIP, but lacking full flexion into fist. With volar pressure over the MCP able to fully flex and then palpable trigger reproduced.           Again, thank you for allowing me to participate in the care of your patient.      Sincerely,    Twyla Draper MD

## 2024-08-14 ENCOUNTER — TELEPHONE (OUTPATIENT)
Dept: ORTHOPEDICS | Facility: CLINIC | Age: 64
End: 2024-08-14
Payer: COMMERCIAL

## 2024-08-14 NOTE — TELEPHONE ENCOUNTER
Left Voicemail (1st Attempt) for the patient to call back and schedule the following:    Appointment type: [facility] Procedure  Provider: Dr Draper  Specialty phone number: [phone]  Additonal Notes: LVM via  services regarding rescheduling 9/24 appt. Contact info provided

## 2024-08-16 ENCOUNTER — TELEPHONE (OUTPATIENT)
Dept: ORTHOPEDICS | Facility: CLINIC | Age: 64
End: 2024-08-16
Payer: COMMERCIAL

## 2024-08-16 NOTE — TELEPHONE ENCOUNTER
Patient Contacted and rescheduled for the following:    Appointment type: [facility] PROCEDURE  Provider: Dr. Draper  Return date: 10/1/24

## 2024-08-17 ENCOUNTER — APPOINTMENT (OUTPATIENT)
Dept: CT IMAGING | Facility: CLINIC | Age: 64
End: 2024-08-17
Attending: EMERGENCY MEDICINE
Payer: COMMERCIAL

## 2024-08-17 ENCOUNTER — HOSPITAL ENCOUNTER (EMERGENCY)
Facility: CLINIC | Age: 64
Discharge: HOME | End: 2024-08-17
Attending: EMERGENCY MEDICINE | Admitting: EMERGENCY MEDICINE
Payer: COMMERCIAL

## 2024-08-17 VITALS
RESPIRATION RATE: 18 BRPM | SYSTOLIC BLOOD PRESSURE: 140 MMHG | DIASTOLIC BLOOD PRESSURE: 75 MMHG | HEIGHT: 66 IN | OXYGEN SATURATION: 94 % | WEIGHT: 222 LBS | HEART RATE: 62 BPM | TEMPERATURE: 97.4 F | BODY MASS INDEX: 35.68 KG/M2

## 2024-08-17 DIAGNOSIS — R10.9 ABDOMINAL PAIN, UNSPECIFIED ABDOMINAL LOCATION: ICD-10-CM

## 2024-08-17 LAB
ALBUMIN SERPL BCG-MCNC: 4.4 G/DL (ref 3.5–5.2)
ALBUMIN UR-MCNC: NEGATIVE MG/DL
ALP SERPL-CCNC: 85 U/L (ref 40–150)
ALT SERPL W P-5'-P-CCNC: 22 U/L (ref 0–50)
ANION GAP SERPL CALCULATED.3IONS-SCNC: 14 MMOL/L (ref 7–15)
APPEARANCE UR: CLEAR
AST SERPL W P-5'-P-CCNC: 22 U/L (ref 0–45)
ATRIAL RATE - MUSE: 64 BPM
BASOPHILS # BLD AUTO: 0 10E3/UL (ref 0–0.2)
BASOPHILS NFR BLD AUTO: 1 %
BILIRUB SERPL-MCNC: 0.2 MG/DL
BILIRUB UR QL STRIP: NEGATIVE
BUN SERPL-MCNC: 13.1 MG/DL (ref 8–23)
CALCIUM SERPL-MCNC: 9.5 MG/DL (ref 8.8–10.4)
CHLORIDE SERPL-SCNC: 99 MMOL/L (ref 98–107)
COLOR UR AUTO: ABNORMAL
CREAT SERPL-MCNC: 0.53 MG/DL (ref 0.51–0.95)
DIASTOLIC BLOOD PRESSURE - MUSE: NORMAL MMHG
EGFRCR SERPLBLD CKD-EPI 2021: >90 ML/MIN/1.73M2
EOSINOPHIL # BLD AUTO: 0.1 10E3/UL (ref 0–0.7)
EOSINOPHIL NFR BLD AUTO: 2 %
ERYTHROCYTE [DISTWIDTH] IN BLOOD BY AUTOMATED COUNT: 12.7 % (ref 10–15)
GLUCOSE SERPL-MCNC: 95 MG/DL (ref 70–99)
GLUCOSE UR STRIP-MCNC: NEGATIVE MG/DL
HCO3 SERPL-SCNC: 23 MMOL/L (ref 22–29)
HCT VFR BLD AUTO: 38.5 % (ref 35–47)
HGB BLD-MCNC: 13.3 G/DL (ref 11.7–15.7)
HGB UR QL STRIP: NEGATIVE
IMM GRANULOCYTES # BLD: 0 10E3/UL
IMM GRANULOCYTES NFR BLD: 0 %
INTERPRETATION ECG - MUSE: NORMAL
KETONES UR STRIP-MCNC: NEGATIVE MG/DL
LEUKOCYTE ESTERASE UR QL STRIP: NEGATIVE
LIPASE SERPL-CCNC: 26 U/L (ref 13–60)
LYMPHOCYTES # BLD AUTO: 1.8 10E3/UL (ref 0.8–5.3)
LYMPHOCYTES NFR BLD AUTO: 41 %
MCH RBC QN AUTO: 33.6 PG (ref 26.5–33)
MCHC RBC AUTO-ENTMCNC: 34.5 G/DL (ref 31.5–36.5)
MCV RBC AUTO: 97 FL (ref 78–100)
MONOCYTES # BLD AUTO: 0.3 10E3/UL (ref 0–1.3)
MONOCYTES NFR BLD AUTO: 7 %
MUCOUS THREADS #/AREA URNS LPF: PRESENT /LPF
NEUTROPHILS # BLD AUTO: 2.2 10E3/UL (ref 1.6–8.3)
NEUTROPHILS NFR BLD AUTO: 49 %
NITRATE UR QL: NEGATIVE
NRBC # BLD AUTO: 0 10E3/UL
NRBC BLD AUTO-RTO: 0 /100
P AXIS - MUSE: 36 DEGREES
PH UR STRIP: 6.5 [PH] (ref 5–7)
PLATELET # BLD AUTO: 196 10E3/UL (ref 150–450)
POTASSIUM SERPL-SCNC: 3.6 MMOL/L (ref 3.4–5.3)
PR INTERVAL - MUSE: 206 MS
PROT SERPL-MCNC: 7.7 G/DL (ref 6.4–8.3)
QRS DURATION - MUSE: 88 MS
QT - MUSE: 434 MS
QTC - MUSE: 447 MS
R AXIS - MUSE: 15 DEGREES
RBC # BLD AUTO: 3.96 10E6/UL (ref 3.8–5.2)
RBC URINE: 0 /HPF
SODIUM SERPL-SCNC: 136 MMOL/L (ref 135–145)
SP GR UR STRIP: 1 (ref 1–1.03)
SQUAMOUS EPITHELIAL: <1 /HPF
SYSTOLIC BLOOD PRESSURE - MUSE: NORMAL MMHG
T AXIS - MUSE: 22 DEGREES
TROPONIN T SERPL HS-MCNC: 6 NG/L
UROBILINOGEN UR STRIP-MCNC: NORMAL MG/DL
VENTRICULAR RATE- MUSE: 64 BPM
WBC # BLD AUTO: 4.5 10E3/UL (ref 4–11)
WBC URINE: <1 /HPF

## 2024-08-17 PROCEDURE — 99285 EMERGENCY DEPT VISIT HI MDM: CPT | Mod: 25 | Performed by: EMERGENCY MEDICINE

## 2024-08-17 PROCEDURE — 93005 ELECTROCARDIOGRAM TRACING: CPT | Performed by: EMERGENCY MEDICINE

## 2024-08-17 PROCEDURE — 74176 CT ABD & PELVIS W/O CONTRAST: CPT

## 2024-08-17 PROCEDURE — 80053 COMPREHEN METABOLIC PANEL: CPT | Performed by: EMERGENCY MEDICINE

## 2024-08-17 PROCEDURE — 96361 HYDRATE IV INFUSION ADD-ON: CPT | Performed by: EMERGENCY MEDICINE

## 2024-08-17 PROCEDURE — 250N000013 HC RX MED GY IP 250 OP 250 PS 637: Performed by: EMERGENCY MEDICINE

## 2024-08-17 PROCEDURE — 84484 ASSAY OF TROPONIN QUANT: CPT | Performed by: EMERGENCY MEDICINE

## 2024-08-17 PROCEDURE — 85025 COMPLETE CBC W/AUTO DIFF WBC: CPT | Performed by: EMERGENCY MEDICINE

## 2024-08-17 PROCEDURE — 93010 ELECTROCARDIOGRAM REPORT: CPT | Performed by: EMERGENCY MEDICINE

## 2024-08-17 PROCEDURE — 99284 EMERGENCY DEPT VISIT MOD MDM: CPT | Performed by: EMERGENCY MEDICINE

## 2024-08-17 PROCEDURE — 36415 COLL VENOUS BLD VENIPUNCTURE: CPT | Performed by: EMERGENCY MEDICINE

## 2024-08-17 PROCEDURE — 81001 URINALYSIS AUTO W/SCOPE: CPT | Performed by: EMERGENCY MEDICINE

## 2024-08-17 PROCEDURE — 83690 ASSAY OF LIPASE: CPT | Performed by: EMERGENCY MEDICINE

## 2024-08-17 PROCEDURE — 258N000003 HC RX IP 258 OP 636: Performed by: EMERGENCY MEDICINE

## 2024-08-17 PROCEDURE — 250N000009 HC RX 250: Performed by: EMERGENCY MEDICINE

## 2024-08-17 PROCEDURE — 96374 THER/PROPH/DIAG INJ IV PUSH: CPT | Performed by: EMERGENCY MEDICINE

## 2024-08-17 RX ORDER — ALUMINA, MAGNESIA, AND SIMETHICONE 2400; 2400; 240 MG/30ML; MG/30ML; MG/30ML
15 SUSPENSION ORAL EVERY 6 HOURS PRN
Qty: 355 ML | Refills: 0 | Status: SHIPPED | OUTPATIENT
Start: 2024-08-17

## 2024-08-17 RX ORDER — SODIUM CHLORIDE 9 MG/ML
INJECTION, SOLUTION INTRAVENOUS
Status: DISCONTINUED
Start: 2024-08-17 | End: 2024-08-17 | Stop reason: HOSPADM

## 2024-08-17 RX ORDER — IOPAMIDOL 755 MG/ML
100 INJECTION, SOLUTION INTRAVASCULAR ONCE
Status: DISCONTINUED | OUTPATIENT
Start: 2024-08-17 | End: 2024-08-17 | Stop reason: HOSPADM

## 2024-08-17 RX ORDER — ACETAMINOPHEN 500 MG
1000 TABLET ORAL ONCE
Status: COMPLETED | OUTPATIENT
Start: 2024-08-17 | End: 2024-08-17

## 2024-08-17 RX ORDER — MAGNESIUM HYDROXIDE/ALUMINUM HYDROXICE/SIMETHICONE 120; 1200; 1200 MG/30ML; MG/30ML; MG/30ML
15 SUSPENSION ORAL ONCE
Status: COMPLETED | OUTPATIENT
Start: 2024-08-17 | End: 2024-08-17

## 2024-08-17 RX ADMIN — ACETAMINOPHEN 1000 MG: 500 TABLET ORAL at 18:29

## 2024-08-17 RX ADMIN — ALUMINUM HYDROXIDE, MAGNESIUM HYDROXIDE, AND SIMETHICONE 15 ML: 1200; 120; 1200 SUSPENSION ORAL at 20:12

## 2024-08-17 RX ADMIN — PANTOPRAZOLE SODIUM 40 MG: 40 INJECTION, POWDER, FOR SOLUTION INTRAVENOUS at 18:38

## 2024-08-17 RX ADMIN — SODIUM CHLORIDE 500 ML: 9 INJECTION, SOLUTION INTRAVENOUS at 18:37

## 2024-08-17 ASSESSMENT — COLUMBIA-SUICIDE SEVERITY RATING SCALE - C-SSRS
6. HAVE YOU EVER DONE ANYTHING, STARTED TO DO ANYTHING, OR PREPARED TO DO ANYTHING TO END YOUR LIFE?: NO
1. IN THE PAST MONTH, HAVE YOU WISHED YOU WERE DEAD OR WISHED YOU COULD GO TO SLEEP AND NOT WAKE UP?: NO
2. HAVE YOU ACTUALLY HAD ANY THOUGHTS OF KILLING YOURSELF IN THE PAST MONTH?: NO

## 2024-08-17 ASSESSMENT — ACTIVITIES OF DAILY LIVING (ADL)
ADLS_ACUITY_SCORE: 35
ADLS_ACUITY_SCORE: 35
ADLS_ACUITY_SCORE: 33
ADLS_ACUITY_SCORE: 33
ADLS_ACUITY_SCORE: 35

## 2024-08-17 NOTE — Clinical Note
Diane Chamberlain was seen and treated in our emergency department on 8/17/2024.  She may return to work on 08/19/2024.       If you have any questions or concerns, please don't hesitate to call.      Paul Flores MD

## 2024-08-17 NOTE — ED TRIAGE NOTES
Patient reports burning medial abdominal pain since yesterday. Denies change in bowel movements. Denies nausea and vomiting. Patient reports it has been burning since she woke up yesterday morning. Patient endorses taking tylenol for the pain.

## 2024-08-17 NOTE — ED PROVIDER NOTES
[facility] EMERGENCY DEPARTMENT ([facility])    24      ED PROVIDER NOTE     History     Chief Complaint   Patient presents with    Heartburn     Patient reports burning medial abdominal pain, no nausea or vomiting      HPI  Diane Chamberlain is a 64 year old female who presents to the emergency department seeking evaluation of abdominal pain.  Patient reports the symptoms for the past 2 weeks.  The symptoms come and go.  She notes that her stomach now feels like it is burning, which is a symptom that has only progressed within the last 48 hours and is the reason that she presents to the ED.  Her symptoms are helped when she eats yogurt.  Her pain is not worsened positionally or with exertion. She reports the burning sensation is throughout her entire abdomen.   She denies nausea, vomiting or fevers.  She denies changes in her bowel movements or in voiding.    Notably, patient states that she had something years ago that was a bacterial infection that feels similar to this.  She was unsure what exactly this was, however through an epic record review, it appears the patient presented with similar symptoms in 2018 that was ultimately found to be H. pylori.  She states that the medication she was given at the time helped alleviate her symptoms. She states this feels the same.    Past Medical History  No past medical history on file.  Past Surgical History:   Procedure Laterality Date     SECTION       alum & mag hydroxide-simethicone (MAALOX  ES) 400-400-40 MG/5ML SUSP suspension  omeprazole (PRILOSEC) 20 MG DR capsule  traMADol (ULTRAM) 50 MG tablet      No Known Allergies  Family History  No family history on file.  Social History   Social History     Tobacco Use    Smoking status: Never    Smokeless tobacco: Never   Substance Use Topics    Alcohol use: No    Drug use: No      A complete review of systems was performed with pertinent positives and negatives noted in the HPI, and all other  "systems negative.    Physical Exam   BP: (!) 140/75  Pulse: 62  Temp: 97.4  F (36.3  C)  Resp: 18  Height: 167.6 cm (5' 6\")  Weight: 100.7 kg (222 lb)  SpO2: 94 %  Physical Exam  Constitutional:       General: She is not in acute distress.     Appearance: She is not toxic-appearing.   HENT:      Head: Normocephalic and atraumatic.      Nose: Nose normal.      Mouth/Throat:      Mouth: Mucous membranes are moist.      Pharynx: Oropharynx is clear.   Eyes:      Conjunctiva/sclera: Conjunctivae normal.      Pupils: Pupils are equal, round, and reactive to light.   Cardiovascular:      Rate and Rhythm: Normal rate and regular rhythm.      Heart sounds: No murmur heard.  Pulmonary:      Effort: Pulmonary effort is normal. No respiratory distress.      Breath sounds: No wheezing.   Abdominal:      General: There is no distension.      Palpations: Abdomen is soft.      Tenderness: There is no guarding or rebound.      Comments: Mild epigastric tenderness   Musculoskeletal:         General: Normal range of motion.   Skin:     General: Skin is warm and dry.   Neurological:      General: No focal deficit present.      Mental Status: She is alert and oriented to person, place, and time.           ED Course, Procedures, & Data      Procedures            EKG Interpretation:      Interpreted by Paul Flores MD  Time reviewed: 1700  Symptoms at time of EKG: abdominal pain   Rhythm: normal sinus   Rate: Normal  Ectopy: none  Conduction: normal  ST Segments/ T Waves: No ST-T wave changes      Clinical Impression: normal EKG                 Results for orders placed or performed during the hospital encounter of 08/17/24   CT Abdomen Pelvis w/o Contrast     Status: None    Narrative    EXAM: CT ABDOMEN PELVIS W/O CONTRAST  LOCATION: [facility]  DATE: 8/17/2024    INDICATION: abdominal pain, tenderness  COMPARISON: None.  TECHNIQUE: CT scan of the abdomen and pelvis was performed " without IV contrast. Multiplanar reformats were obtained. Dose reduction techniques were used.  CONTRAST: None.    FINDINGS:   LOWER CHEST: Normal.    HEPATOBILIARY: Normal.    PANCREAS: Normal.    SPLEEN: Normal.    ADRENAL GLANDS: Normal.    KIDNEYS/BLADDER: Normal.    BOWEL: Normal.    LYMPH NODES: Normal.    VASCULATURE: Mild vascular calcification.    PELVIC ORGANS: Normal.    MUSCULOSKELETAL: Normal.      Impression    IMPRESSION:   1.  Normal appendix.    2.  No urinary calculi or hydronephrosis.     Comprehensive metabolic panel     Status: Normal   Result Value Ref Range    Sodium 136 135 - 145 mmol/L    Potassium 3.6 3.4 - 5.3 mmol/L    Carbon Dioxide (CO2) 23 22 - 29 mmol/L    Anion Gap 14 7 - 15 mmol/L    Urea Nitrogen 13.1 8.0 - 23.0 mg/dL    Creatinine 0.53 0.51 - 0.95 mg/dL    GFR Estimate >90 >60 mL/min/1.73m2    Calcium 9.5 8.8 - 10.4 mg/dL    Chloride 99 98 - 107 mmol/L    Glucose 95 70 - 99 mg/dL    Alkaline Phosphatase 85 40 - 150 U/L    AST 22 0 - 45 U/L    ALT 22 0 - 50 U/L    Protein Total 7.7 6.4 - 8.3 g/dL    Albumin 4.4 3.5 - 5.2 g/dL    Bilirubin Total 0.2 <=1.2 mg/dL   Lipase     Status: Normal   Result Value Ref Range    Lipase 26 13 - 60 U/L   Troponin T, High Sensitivity     Status: Normal   Result Value Ref Range    Troponin T, High Sensitivity 6 <=14 ng/L   CBC with platelets and differential     Status: Abnormal   Result Value Ref Range    WBC Count 4.5 4.0 - 11.0 10e3/uL    RBC Count 3.96 3.80 - 5.20 10e6/uL    Hemoglobin 13.3 11.7 - 15.7 g/dL    Hematocrit 38.5 35.0 - 47.0 %    MCV 97 78 - 100 fL    MCH 33.6 (H) 26.5 - 33.0 pg    MCHC 34.5 31.5 - 36.5 g/dL    RDW 12.7 10.0 - 15.0 %    Platelet Count 196 150 - 450 10e3/uL    % Neutrophils 49 %    % Lymphocytes 41 %    % Monocytes 7 %    % Eosinophils 2 %    % Basophils 1 %    % Immature Granulocytes 0 %    NRBCs per 100 WBC 0 <1 /100    Absolute Neutrophils 2.2 1.6 - 8.3 10e3/uL    Absolute Lymphocytes 1.8 0.8 - 5.3 10e3/uL     Absolute Monocytes 0.3 0.0 - 1.3 10e3/uL    Absolute Eosinophils 0.1 0.0 - 0.7 10e3/uL    Absolute Basophils 0.0 0.0 - 0.2 10e3/uL    Absolute Immature Granulocytes 0.0 <=0.4 10e3/uL    Absolute NRBCs 0.0 10e3/uL   UA with Microscopic reflex to Culture     Status: Abnormal    Specimen: Urine, Midstream   Result Value Ref Range    Color Urine Straw Colorless, Straw, Light Yellow, Yellow    Appearance Urine Clear Clear    Glucose Urine Negative Negative mg/dL    Bilirubin Urine Negative Negative    Ketones Urine Negative Negative mg/dL    Specific Gravity Urine 1.005 1.003 - 1.035    Blood Urine Negative Negative    pH Urine 6.5 5.0 - 7.0    Protein Albumin Urine Negative Negative mg/dL    Urobilinogen Urine Normal Normal, 2.0 mg/dL    Nitrite Urine Negative Negative    Leukocyte Esterase Urine Negative Negative    Mucus Urine Present (A) None Seen /LPF    RBC Urine 0 <=2 /HPF    WBC Urine <1 <=5 /HPF    Squamous Epithelials Urine <1 <=1 /HPF    Narrative    Urine Culture not indicated   EKG 12-lead, tracing only     Status: None   Result Value Ref Range    Systolic Blood Pressure  mmHg    Diastolic Blood Pressure  mmHg    Ventricular Rate 64 BPM    Atrial Rate 64 BPM    NY Interval 206 ms    QRS Duration 88 ms     ms    QTc 447 ms    P Axis 36 degrees    R AXIS 15 degrees    T Axis 22 degrees    Interpretation ECG       Sinus rhythm with sinus arrhythmia  Normal ECG  Unconfirmed report - interpretation of this ECG is computer generated - see medical record for final interpretation  Confirmed by - EMERGENCY ROOM, PHYSICIAN ([id number]),  ROSALVA PARDO ([id number]) on 8/17/2024 9:32:54 PM     CBC with platelets differential     Status: Abnormal    Narrative    The following orders were created for panel order CBC with platelets differential.  Procedure                               Abnormality         Status                     ---------                               -----------         ------                      CBC with platelets and d...[[id number]]  Abnormal            Final result                 Please view results for these tests on the individual orders.     Medications   sodium chloride 0.9% BOLUS 500 mL (0 mLs Intravenous Stopped 8/17/24 2013)   acetaminophen (TYLENOL) tablet 1,000 mg (1,000 mg Oral $Given 8/17/24 1829)   pantoprazole (PROTONIX) IV push injection 40 mg (40 mg Intravenous $Given 8/17/24 1838)   alum & mag hydroxide-simethicone (MAALOX) suspension 15 mL (15 mLs Oral $Given 8/17/24 2012)     Labs Ordered and Resulted from Time of ED Arrival to Time of ED Departure   CBC WITH PLATELETS AND DIFFERENTIAL - Abnormal       Result Value    WBC Count 4.5      RBC Count 3.96      Hemoglobin 13.3      Hematocrit 38.5      MCV 97      MCH 33.6 (*)     MCHC 34.5      RDW 12.7      Platelet Count 196      % Neutrophils 49      % Lymphocytes 41      % Monocytes 7      % Eosinophils 2      % Basophils 1      % Immature Granulocytes 0      NRBCs per 100 WBC 0      Absolute Neutrophils 2.2      Absolute Lymphocytes 1.8      Absolute Monocytes 0.3      Absolute Eosinophils 0.1      Absolute Basophils 0.0      Absolute Immature Granulocytes 0.0      Absolute NRBCs 0.0     ROUTINE UA WITH MICROSCOPIC REFLEX TO CULTURE - Abnormal    Color Urine Straw      Appearance Urine Clear      Glucose Urine Negative      Bilirubin Urine Negative      Ketones Urine Negative      Specific Gravity Urine 1.005      Blood Urine Negative      pH Urine 6.5      Protein Albumin Urine Negative      Urobilinogen Urine Normal      Nitrite Urine Negative      Leukocyte Esterase Urine Negative      Mucus Urine Present (*)     RBC Urine 0      WBC Urine <1      Squamous Epithelials Urine <1     COMPREHENSIVE METABOLIC PANEL - Normal    Sodium 136      Potassium 3.6      Carbon Dioxide (CO2) 23      Anion Gap 14      Urea Nitrogen 13.1      Creatinine 0.53      GFR Estimate >90      Calcium 9.5      Chloride 99      Glucose 95      Alkaline  Phosphatase 85      AST 22      ALT 22      Protein Total 7.7      Albumin 4.4      Bilirubin Total 0.2     LIPASE - Normal    Lipase 26     TROPONIN T, HIGH SENSITIVITY - Normal    Troponin T, High Sensitivity 6       CT Abdomen Pelvis w/o Contrast   Final Result   IMPRESSION:    1.  Normal appendix.      2.  No urinary calculi or hydronephrosis.                Critical care was not performed.     Medical Decision Making  The patient's presentation was of moderate complexity (an undiagnosed new problem with uncertain prognosis).    The patient's evaluation involved:  review of external note(s) from 2 sources (clinic note, ED note)  review of 2 test result(s) ordered prior to this encounter (cbc, cmp)  ordering and/or review of 3+ test(s) in this encounter (see separate area of note for details)    The patient's management necessitated moderate risk (prescription drug management including medications given in the ED).    Assessment & Plan    This is a 64-year-old female presenting with abdominal pain.  Vitals notable for hypertension but otherwise reassuring and she was afebrile.  Overall she was well-appearing, exam did show some mild epigastric tenderness.  She reports having similar symptoms years ago and had been diagnosed with H. Pylori.    Symptoms could be related to GERD or recurrent H. pylori, for intra-abdominal process was considered and so blood work was ordered as well as CT scan.  Blood work is overall reassuring and showed no leukocytosis or anemia and CMP was within normal limits.  Lipase was normal as well.  UA negative for infection.  Her CT showed no acute findings.    In the ED the patient received fluids, Protonix, Maalox, and Tylenol and reported significant improvement in symptoms.  I recommended that she follow up with GI. Return precautions were discussed and all questions answered.    A InterResolve  was used for this encounter.    I have reviewed the nursing notes. I have reviewed the  findings, diagnosis, plan and need for follow up with the patient.    Discharge Medication List as of 8/17/2024  8:42 PM        START taking these medications    Details   alum & mag hydroxide-simethicone (MAALOX  ES) 400-400-40 MG/5ML SUSP suspension Take 15 mLs by mouth every 6 hours as needed for indigestion, Disp-355 mL, R-0, E-Prescribe      omeprazole (PRILOSEC) 20 MG DR capsule Take 1 capsule (20 mg) by mouth 2 times daily for 14 days, Disp-28 capsule, R-0, E-Prescribe             Final diagnoses:   Abdominal pain, unspecified abdominal location       Paul Flores I, Manuel Alvarado, am serving as a trained medical scribe to document services personally performed by Paul Flores MD based on the provider's statements to me on August 17, 2024.  This document has been checked and approved by the attending provider.    Paul CHRIS MD, was physically present and have reviewed and verified the accuracy of this note documented by Manuel Alvarado medical scribe.      Paul Flores MD   formerly [facility] EMERGENCY DEPARTMENT  8/17/2024     Pual Flores MD  08/17/24 [id number]

## 2024-08-19 ENCOUNTER — TELEPHONE (OUTPATIENT)
Dept: GASTROENTEROLOGY | Facility: CLINIC | Age: 64
End: 2024-08-19

## 2024-08-19 NOTE — TELEPHONE ENCOUNTER
[facility] Call Center    Phone Message    May a detailed message be left on voicemail: Yes    Reason for Call: Other: Patient is currently scheduled on 11/5, as visit type New GI Urgent. This is outside the expected timeline for this referral. Patient has been added to the waitlist.      Action Taken: Message routed to:  Other: GI REFERRAL TRIAGE POOL     Travel Screening: Not Applicable

## 2024-09-16 ENCOUNTER — HOSPITAL ENCOUNTER (EMERGENCY)
Facility: CLINIC | Age: 64
Discharge: HOME | End: 2024-09-16
Attending: FAMILY MEDICINE | Admitting: FAMILY MEDICINE
Payer: COMMERCIAL

## 2024-09-16 VITALS
HEART RATE: 54 BPM | TEMPERATURE: 97 F | BODY MASS INDEX: 35.19 KG/M2 | OXYGEN SATURATION: 100 % | SYSTOLIC BLOOD PRESSURE: 120 MMHG | DIASTOLIC BLOOD PRESSURE: 77 MMHG | RESPIRATION RATE: 16 BRPM | WEIGHT: 218 LBS

## 2024-09-16 DIAGNOSIS — M54.50 LUMBAR BACK PAIN: ICD-10-CM

## 2024-09-16 DIAGNOSIS — M54.10 RADICULAR LEG PAIN: ICD-10-CM

## 2024-09-16 PROCEDURE — 250N000013 HC RX MED GY IP 250 OP 250 PS 637: Performed by: EMERGENCY MEDICINE

## 2024-09-16 PROCEDURE — 99284 EMERGENCY DEPT VISIT MOD MDM: CPT | Performed by: FAMILY MEDICINE

## 2024-09-16 RX ORDER — CYCLOBENZAPRINE HCL 10 MG
10 TABLET ORAL 3 TIMES DAILY PRN
Qty: 15 TABLET | Refills: 0 | Status: SHIPPED | OUTPATIENT
Start: 2024-09-16

## 2024-09-16 RX ORDER — IBUPROFEN 800 MG/1
800 TABLET, FILM COATED ORAL EVERY 8 HOURS PRN
Qty: 15 TABLET | Refills: 0 | Status: SHIPPED | OUTPATIENT
Start: 2024-09-16 | End: 2024-09-21

## 2024-09-16 RX ORDER — GABAPENTIN 100 MG/1
100 CAPSULE ORAL 3 TIMES DAILY
Qty: 15 CAPSULE | Refills: 0 | Status: SHIPPED | OUTPATIENT
Start: 2024-09-16

## 2024-09-16 RX ORDER — IBUPROFEN 600 MG/1
600 TABLET, FILM COATED ORAL ONCE
Status: COMPLETED | OUTPATIENT
Start: 2024-09-16 | End: 2024-09-16

## 2024-09-16 RX ADMIN — IBUPROFEN 600 MG: 600 TABLET, FILM COATED ORAL at 16:05

## 2024-09-16 ASSESSMENT — ACTIVITIES OF DAILY LIVING (ADL)
ADLS_ACUITY_SCORE: 35

## 2024-09-16 NOTE — LETTER
September 16, 2024      To Whom It May Concern:      Diane Chamberlain was seen in our Emergency Department today, 09/16/24.  I expect her condition to improve over the next 1 days.  She may return to work/school when improved.    Sincerely,        Apple Izaguirre RN

## 2024-09-16 NOTE — ED PROVIDER NOTES
[facility] [place] [place] EMERGENCY DEPARTMENT ([facility])    24      ED PROVIDER NOTE     History     Chief Complaint   Patient presents with    Back Pain     Nontraumatic low back pain radiating into left leg that started three days ago.  She was started on a cholesterol medication recently and believes it may have caused it.  Pain is sharp and makes it hard for her to walk.      HPI  Diane Chamberlain is a 64 year old female with history of prediabetes, right knee osteoarthritis who presents with low back pain radiating into her left leg.  The pain is sharp, makes it hard for her to walk.  She notes being started on a cholesterol medication recently and thinks that symptoms may be related to this.  Per [facility] Everywhere records, she was started on Crestor 10 mg for hyperlipidemia on 2024 through [facility].  She also has a distant history of lumbar paraspinous muscle spasms, was on Naprosyn for this in .  [facility]    Past Medical History  No past medical history on file.  Past Surgical History:   Procedure Laterality Date     SECTION       cyclobenzaprine (FLEXERIL) 10 MG tablet  gabapentin (NEURONTIN) 100 MG capsule  ibuprofen (ADVIL/MOTRIN) 800 MG tablet  alum & mag hydroxide-simethicone (MAALOX  ES) 400-400-40 MG/5ML SUSP suspension  traMADol (ULTRAM) 50 MG tablet      No Known Allergies  Family History  No family history on file.  Social History   Social History     Tobacco Use    Smoking status: Never    Smokeless tobacco: Never   Substance Use Topics    Alcohol use: No    Drug use: No      A medically appropriate review of systems was performed with pertinent positives and negatives noted in the HPI, and all other systems negative.    Physical Exam   BP: 106/71  Pulse: 59  Temp: 97.4  F (36.3  C)  Resp: 16  Weight: 98.9 kg (218 lb)  SpO2: 100 %  Physical Exam  Constitutional:       General: She is not in acute distress.     Appearance:  1. Take folic acid when it arrives.    2. We will schedule follow-up in 4 weeks to reassess anemia, folic acid.    3. If needed, we will arrange injections to treat anemia related to your kidney disease.    Normal appearance. She is not diaphoretic.   HENT:      Head: Atraumatic.      Mouth/Throat:      Mouth: Mucous membranes are moist.   Eyes:      General: No scleral icterus.     Conjunctiva/sclera: Conjunctivae normal.   Cardiovascular:      Rate and Rhythm: Normal rate.      Heart sounds: Normal heart sounds.   Pulmonary:      Effort: No respiratory distress.      Breath sounds: Normal breath sounds.   Abdominal:      General: Abdomen is flat.   Musculoskeletal:      Cervical back: Neck supple.      Lumbar back: Spasms and tenderness present. Positive left straight leg raise test.   Skin:     General: Skin is warm.      Findings: No rash.   Neurological:      General: No focal deficit present.      Mental Status: She is alert and oriented to person, place, and time.      Sensory: No sensory deficit.      Motor: No weakness.      Coordination: Coordination normal.           ED Course, Procedures, & Data      Procedures      No results found for any visits on 09/16/24.  Medications   ibuprofen (ADVIL/MOTRIN) tablet 600 mg (600 mg Oral $Given 9/16/24 1605)     Labs Ordered and Resulted from Time of ED Arrival to Time of ED Departure - No data to display  No orders to display          Critical care was not performed.     Medical Decision Making  The patient's presentation was of moderate complexity (an acute illness with systemic symptoms).    The patient's evaluation involved:  history and exam without other MDM data elements    The patient's management necessitated moderate risk (prescription drug management including medications given in the ED).    Assessment & Plan        I have reviewed the nursing notes. I have reviewed the findings, diagnosis, plan and need for follow up with the patient.    New Prescriptions    CYCLOBENZAPRINE (FLEXERIL) 10 MG TABLET    Take 1 tablet (10 mg) by mouth 3 times daily as needed for muscle spasms.    GABAPENTIN (NEURONTIN) 100 MG CAPSULE    Take 1 capsule (100 mg) by mouth 3 times  daily.    IBUPROFEN (ADVIL/MOTRIN) 800 MG TABLET    Take 1 tablet (800 mg) by mouth every 8 hours as needed for moderate pain.       Final diagnoses:   Radicular leg pain   Lumbar back pain         [facility] EMERGENCY DEPARTMENT  9/16/2024     Frandy Odonnell MD  09/16/24 2531

## 2024-09-16 NOTE — ED TRIAGE NOTES
Triage Assessment (Adult)       Row Name 09/16/24 1516          Triage Assessment    Airway WDL WDL        Respiratory WDL    Respiratory WDL WDL        Skin Circulation/Temperature WDL    Skin Circulation/Temperature WDL WDL        Cardiac WDL    Cardiac WDL WDL        Peripheral/Neurovascular WDL    Peripheral Neurovascular WDL WDL        Cognitive/Neuro/Behavioral WDL    Cognitive/Neuro/Behavioral WDL WDL

## 2024-09-16 NOTE — DISCHARGE INSTRUCTIONS
Discharge to home with medications as prescribed following up with [facility] center for further evaluation if needed.

## 2024-09-26 NOTE — TELEPHONE ENCOUNTER
REFERRAL INFORMATION:  Referring Provider:      aPul Flores MD     Referring Clinic:   EMERGENCY DEPT   Reason for Visit/Diagnosis: R10.9 (ICD-10-CM) - Abdominal pain, unspecified abdominal location      FUTURE VISIT INFORMATION:  Appointment Date: 11/5/24  Appointment Time:      NOTES STATUS DETAILS   OFFICE NOTE from Referring Provider Internal ED 8/17/24   OFFICE NOTE from Other Specialist N/A    HOSPITAL DISCHARGE SUMMARY/  ED VISITS Internal ED 8/17/24   OPERATIVE REPORT N/A    MEDICATION LIST Internal         ENDOSCOPY  N/A    COLONOSCOPY N/A    ERCP N/A    EUS N/A    STOOL TESTING Care Everywhere 9/4/24-H. Pylori  8/7/18-H. Pylori   PERTINENT LABS Care Everywhere    PATHOLOGY REPORTS (RELATED) N/A    IMAGING (CT, MRI, EGD, MRCP, Small Bowel Follow Through/SBT, MR/CT Enterography) Internal 8/17/24-CT abd pel

## 2024-10-03 NOTE — TELEPHONE ENCOUNTER
Writer and  called to help get Pt scheduled for a sooner GI appointment. The phone call would be picked up but no one would answer or talk on the other line.

## 2024-10-22 ENCOUNTER — OFFICE VISIT (OUTPATIENT)
Dept: ORTHOPEDICS | Facility: CLINIC | Age: 64
End: 2024-10-22
Payer: COMMERCIAL

## 2024-10-22 VITALS — WEIGHT: 218 LBS | BODY MASS INDEX: 35.03 KG/M2 | HEIGHT: 66 IN

## 2024-10-22 DIAGNOSIS — M65.341 TRIGGER RING FINGER OF RIGHT HAND: Primary | ICD-10-CM

## 2024-10-22 PROCEDURE — 99213 OFFICE O/P EST LOW 20 MIN: CPT | Performed by: STUDENT IN AN ORGANIZED HEALTH CARE EDUCATION/TRAINING PROGRAM

## 2024-10-22 NOTE — PROGRESS NOTES
Sports Medicine Clinic           ASSESSMENT and PLAN:     Diane was seen today for recheck.    Diagnoses and all orders for this visit:    Trigger ring finger of right hand  Persistent trigger finger of the 4th right digit, improved but continues to be symptomatic. She lost daytime oval 8 and so symptoms have been worse since then. We discussed treatment options including consistent oval 8, continued topical voltaren, CSI or surgical release. She again would like to try splinting and voltaren and will follow up for CSI if not improved over the next month.   - follow up in 1 month for CSI if not improved  - oval 8 and topical voltaren    Return sooner if develops new or worsening symptoms.    Options for treatment and/or follow-up care were reviewed with the patient was actively involved in the decision making process. Patient verbalized understanding and was in agreement with the plan.      Twyla Draper MD, [facility]  Primary Care Sports Medicine           SUBJECTIVE       Diane Chamberlain is a 64 year old female presenting to clinic today for follow up of right hand trigger finger.    Patient was last seen on 7/23/24.    Since their last visit the finger is about the same. She got relief with using the diclofenac gel. She gets occasional catching and locking but less than previously.  She has been wearing her splint at night but lost the correct size for her daytime use, so hasn't been using it during the day. It is better when she wears the splint constantly and uses the diclofenac.     PMH, Medications and Allergies were reviewed and updated as needed.    ROS:  As noted above otherwise negative.    There is no problem list on file for this patient.      Current Outpatient Medications   Medication Sig Dispense Refill    alum & mag hydroxide-simethicone (MAALOX  ES) 400-400-40 MG/5ML SUSP suspension Take 15 mLs by mouth every 6 hours as needed for indigestion 355 mL 0    cyclobenzaprine (FLEXERIL) 10 MG tablet Take  "1 tablet (10 mg) by mouth 3 times daily as needed for muscle spasms. 15 tablet 0    gabapentin (NEURONTIN) 100 MG capsule Take 1 capsule (100 mg) by mouth 3 times daily. 15 capsule 0    traMADol (ULTRAM) 50 MG tablet Take 1 tablet (50 mg) by mouth every 6 hours as needed for moderate to severe pain 10 tablet 0            OBJECTIVE:       Vitals:   Vitals:    10/22/24 1523   Weight: 98.9 kg (218 lb)   Height: 1.676 m (5' 6\")     BMI: Body mass index is 35.19 kg/m .    Gen:  Well nourished and in no acute distress  HEENT: Extraocular movement intact  Neck: Supple  Pulm:  Breathing Comfortably. No increased respiratory effort.  Psych: Euthymic. Appropriately answers questions    MSK:   Slight swelling over the 4th MCP on the volar side. Able to flex and extend actively at the MCP, PIP and DIP, but lacking full flexion into fist. With volar pressure over the MCP able to fully flex and then palpable trigger reproduced.         "

## 2024-10-22 NOTE — LETTER
10/22/2024      RE: Diane Chamberlain  [address]  [facility] [address]     Dear Colleague,    Thank you for referring your patient, Diane Chamberlain, to the [facility] [place]. Please see a copy of my visit note below.      Sports Medicine Clinic           ASSESSMENT and PLAN:     Diane was seen today for recheck.    Diagnoses and all orders for this visit:    Trigger ring finger of right hand  Persistent trigger finger of the 4th right digit, improved but continues to be symptomatic. She lost daytime oval 8 and so symptoms have been worse since then. We discussed treatment options including consistent oval 8, continued topical voltaren, CSI or surgical release. She again would like to try splinting and voltaren and will follow up for CSI if not improved over the next month.   - follow up in 1 month for CSI if not improved  - oval 8 and topical voltaren    Return sooner if develops new or worsening symptoms.    Options for treatment and/or follow-up care were reviewed with the patient was actively involved in the decision making process. Patient verbalized understanding and was in agreement with the plan.      Twyla Draper MD, [facility]  Primary Care Sports Medicine           SUBJECTIVE       Diane Chamberlain is a 64 year old female presenting to clinic today for follow up of right hand trigger finger.    Patient was last seen on 7/23/24.    Since their last visit the finger is about the same. She got relief with using the diclofenac gel. She gets occasional catching and locking but less than previously.  She has been wearing her splint at night but lost the correct size for her daytime use, so hasn't been using it during the day. It is better when she wears the splint constantly and uses the diclofenac.     PMH, Medications and Allergies were reviewed and updated as needed.    ROS:  As noted above otherwise negative.    There is no problem list on file for this  "patient.      Current Outpatient Medications   Medication Sig Dispense Refill     alum & mag hydroxide-simethicone (MAALOX  ES) 400-400-40 MG/5ML SUSP suspension Take 15 mLs by mouth every 6 hours as needed for indigestion 355 mL 0     cyclobenzaprine (FLEXERIL) 10 MG tablet Take 1 tablet (10 mg) by mouth 3 times daily as needed for muscle spasms. 15 tablet 0     gabapentin (NEURONTIN) 100 MG capsule Take 1 capsule (100 mg) by mouth 3 times daily. 15 capsule 0     traMADol (ULTRAM) 50 MG tablet Take 1 tablet (50 mg) by mouth every 6 hours as needed for moderate to severe pain 10 tablet 0            OBJECTIVE:       Vitals:   Vitals:    10/22/24 1523   Weight: 98.9 kg (218 lb)   Height: 1.676 m (5' 6\")     BMI: Body mass index is 35.19 kg/m .    Gen:  Well nourished and in no acute distress  HEENT: Extraocular movement intact  Neck: Supple  Pulm:  Breathing Comfortably. No increased respiratory effort.  Psych: Euthymic. Appropriately answers questions    MSK:   Slight swelling over the 4th MCP on the volar side. Able to flex and extend actively at the MCP, PIP and DIP, but lacking full flexion into fist. With volar pressure over the MCP able to fully flex and then palpable trigger reproduced.           Again, thank you for allowing me to participate in the care of your patient.      Sincerely,    Twyla Draper MD  "

## 2024-11-05 ENCOUNTER — PRE VISIT (OUTPATIENT)
Dept: GASTROENTEROLOGY | Facility: CLINIC | Age: 64
End: 2024-11-05

## 2024-11-06 ENCOUNTER — TELEPHONE (OUTPATIENT)
Dept: GASTROENTEROLOGY | Facility: CLINIC | Age: 64
End: 2024-11-06
Payer: COMMERCIAL

## 2024-11-19 ENCOUNTER — OFFICE VISIT (OUTPATIENT)
Dept: ORTHOPEDICS | Facility: CLINIC | Age: 64
End: 2024-11-19
Payer: COMMERCIAL

## 2024-11-19 DIAGNOSIS — M65.341 TRIGGER RING FINGER OF RIGHT HAND: Primary | ICD-10-CM

## 2024-11-19 NOTE — PROGRESS NOTES
Sports Medicine Clinic           ASSESSMENT and PLAN:     Diagnoses and all orders for this visit:    Trigger ring finger of right hand  Persistent but improved trigger finger of the 4th right digit, recently significantly improved, now able to fully make fist after consistently using voltaren gel and oval 8 splint. She would like to try hand therapy and hopefully avoid an injection or other intervention.  -     Hand Therapy Referral; Future  -     continue oval 8 and voltaren gel PRN  -     follow up as needed    Return sooner if develops new or worsening symptoms.    Options for treatment and/or follow-up care were reviewed with the patient was actively involved in the decision making process. Patient verbalized understanding and was in agreement with the plan.      Twyla Draper MD, [facility]  Primary Care Sports Medicine           SUBJECTIVE       Diane Chamberlain is a 64 year old female presenting to clinic today for follow up of Right trigger finger.    Patient was last seen on 10/22/24.    Since their last visit her finger is improved. She has been wearing the splint during the day. She sometimes wears it at night. She has been using voltaren gel occasionally when she was having more pain she was using it more. She is interested in doing hand therapy. She needs a new oval 8 splint as well.       PMH, Medications and Allergies were reviewed and updated as needed.    ROS:  As noted above otherwise negative.    There is no problem list on file for this patient.      Current Outpatient Medications   Medication Sig Dispense Refill    alum & mag hydroxide-simethicone (MAALOX  ES) 400-400-40 MG/5ML SUSP suspension Take 15 mLs by mouth every 6 hours as needed for indigestion 355 mL 0    cyclobenzaprine (FLEXERIL) 10 MG tablet Take 1 tablet (10 mg) by mouth 3 times daily as needed for muscle spasms. 15 tablet 0    gabapentin (NEURONTIN) 100 MG capsule Take 1 capsule (100 mg) by mouth 3 times daily. 15 capsule 0     traMADol (ULTRAM) 50 MG tablet Take 1 tablet (50 mg) by mouth every 6 hours as needed for moderate to severe pain 10 tablet 0            OBJECTIVE:       Vitals: There were no vitals filed for this visit.  BMI: There is no height or weight on file to calculate BMI.    Gen:  Well nourished and in no acute distress  HEENT: Extraocular movement intact  Neck: Supple  Pulm:  Breathing Comfortably. No increased respiratory effort.  Psych: Euthymic. Appropriately answers questions    MSK:   Slight swelling over the 4th MCP on the volar side. Able to flex and extend actively at the MCP, PIP and DIP. Can now fully flex into a fist, previously unable to. With volar pressure over the MCP able to fully flex and then palpable trigger reproduced.

## 2024-11-19 NOTE — LETTER
11/19/2024      RE: Diane Chamberlain  [address]  [facility] [address]     Dear Colleague,    Thank you for referring your patient, Diane Chamberlain, to the [facility]TS [facility] [place]. Please see a copy of my visit note below.      Sports Medicine Clinic           ASSESSMENT and PLAN:     Diagnoses and all orders for this visit:    Trigger ring finger of right hand  Persistent but improved trigger finger of the 4th right digit, recently significantly improved, now able to fully make fist after consistently using voltaren gel and oval 8 splint. She would like to try hand therapy and hopefully avoid an injection or other intervention.  -     Hand Therapy Referral; Future  -     continue oval 8 and voltaren gel PRN  -     follow up as needed    Return sooner if develops new or worsening symptoms.    Options for treatment and/or follow-up care were reviewed with the patient was actively involved in the decision making process. Patient verbalized understanding and was in agreement with the plan.      Twyla Draper MD, [facility]  Primary Care Sports Medicine           SUBJECTIVE       Diane Chamberlain is a 64 year old female presenting to clinic today for follow up of Right trigger finger.    Patient was last seen on 10/22/24.    Since their last visit her finger is improved. She has been wearing the splint during the day. She sometimes wears it at night. She has been using voltaren gel occasionally when she was having more pain she was using it more. She is interested in doing hand therapy. She needs a new oval 8 splint as well.       PMH, Medications and Allergies were reviewed and updated as needed.    ROS:  As noted above otherwise negative.    There is no problem list on file for this patient.      Current Outpatient Medications   Medication Sig Dispense Refill     alum & mag hydroxide-simethicone (MAALOX  ES) 400-400-40 MG/5ML SUSP suspension Take 15 mLs by mouth every 6 hours as  needed for indigestion 355 mL 0     cyclobenzaprine (FLEXERIL) 10 MG tablet Take 1 tablet (10 mg) by mouth 3 times daily as needed for muscle spasms. 15 tablet 0     gabapentin (NEURONTIN) 100 MG capsule Take 1 capsule (100 mg) by mouth 3 times daily. 15 capsule 0     traMADol (ULTRAM) 50 MG tablet Take 1 tablet (50 mg) by mouth every 6 hours as needed for moderate to severe pain 10 tablet 0            OBJECTIVE:       Vitals: There were no vitals filed for this visit.  BMI: There is no height or weight on file to calculate BMI.    Gen:  Well nourished and in no acute distress  HEENT: Extraocular movement intact  Neck: Supple  Pulm:  Breathing Comfortably. No increased respiratory effort.  Psych: Euthymic. Appropriately answers questions    MSK:   Slight swelling over the 4th MCP on the volar side. Able to flex and extend actively at the MCP, PIP and DIP. Can now fully flex into a fist, previously unable to. With volar pressure over the MCP able to fully flex and then palpable trigger reproduced.           Again, thank you for allowing me to participate in the care of your patient.      Sincerely,    Twyla Draper MD

## 2024-11-23 ENCOUNTER — HEALTH MAINTENANCE LETTER (OUTPATIENT)
Age: 64
End: 2024-11-23

## 2025-01-14 ENCOUNTER — HOSPITAL ENCOUNTER (EMERGENCY)
Facility: CLINIC | Age: 65
Discharge: HOME | End: 2025-01-14
Attending: FAMILY MEDICINE | Admitting: FAMILY MEDICINE
Payer: MEDICAID

## 2025-01-14 ENCOUNTER — APPOINTMENT (OUTPATIENT)
Dept: GENERAL RADIOLOGY | Facility: CLINIC | Age: 65
End: 2025-01-14
Attending: FAMILY MEDICINE
Payer: MEDICAID

## 2025-01-14 VITALS
HEIGHT: 62 IN | RESPIRATION RATE: 14 BRPM | OXYGEN SATURATION: 100 % | SYSTOLIC BLOOD PRESSURE: 125 MMHG | BODY MASS INDEX: 41.96 KG/M2 | DIASTOLIC BLOOD PRESSURE: 76 MMHG | HEART RATE: 69 BPM | TEMPERATURE: 97.3 F | WEIGHT: 228 LBS

## 2025-01-14 DIAGNOSIS — M17.11 PRIMARY OSTEOARTHRITIS OF RIGHT KNEE: ICD-10-CM

## 2025-01-14 PROCEDURE — 99283 EMERGENCY DEPT VISIT LOW MDM: CPT | Performed by: FAMILY MEDICINE

## 2025-01-14 PROCEDURE — 73562 X-RAY EXAM OF KNEE 3: CPT | Mod: RT

## 2025-01-14 PROCEDURE — 250N000013 HC RX MED GY IP 250 OP 250 PS 637: Performed by: FAMILY MEDICINE

## 2025-01-14 PROCEDURE — 99284 EMERGENCY DEPT VISIT MOD MDM: CPT | Performed by: FAMILY MEDICINE

## 2025-01-14 RX ORDER — ACETAMINOPHEN 500 MG
1000 TABLET ORAL 3 TIMES DAILY
Qty: 60 TABLET | Refills: 1 | Status: SHIPPED | OUTPATIENT
Start: 2025-01-14 | End: 2025-02-03

## 2025-01-14 RX ORDER — IBUPROFEN 200 MG
600 TABLET ORAL EVERY 6 HOURS PRN
Qty: 30 TABLET | Refills: 0 | Status: SHIPPED | OUTPATIENT
Start: 2025-01-14

## 2025-01-14 RX ORDER — IBUPROFEN 600 MG/1
600 TABLET, FILM COATED ORAL ONCE
Status: COMPLETED | OUTPATIENT
Start: 2025-01-14 | End: 2025-01-14

## 2025-01-14 RX ADMIN — IBUPROFEN 600 MG: 600 TABLET, FILM COATED ORAL at 12:37

## 2025-01-14 ASSESSMENT — ACTIVITIES OF DAILY LIVING (ADL)
ADLS_ACUITY_SCORE: 41
ADLS_ACUITY_SCORE: 42
ADLS_ACUITY_SCORE: 41
ADLS_ACUITY_SCORE: 42

## 2025-01-14 ASSESSMENT — COLUMBIA-SUICIDE SEVERITY RATING SCALE - C-SSRS
2. HAVE YOU ACTUALLY HAD ANY THOUGHTS OF KILLING YOURSELF IN THE PAST MONTH?: NO
6. HAVE YOU EVER DONE ANYTHING, STARTED TO DO ANYTHING, OR PREPARED TO DO ANYTHING TO END YOUR LIFE?: NO
1. IN THE PAST MONTH, HAVE YOU WISHED YOU WERE DEAD OR WISHED YOU COULD GO TO SLEEP AND NOT WAKE UP?: NO

## 2025-01-14 NOTE — DISCHARGE INSTRUCTIONS
Continue to use your knee brace.  May use ice and elevation on days when your knee is bothering you, this could be helpful after work.  Take regularly scheduled Tylenol 1000 mg 3 times daily.  May use ibuprofen as needed for breakthrough pain.  Please make an appointment to follow up with Sports Medicine (phone: [phone]) as soon as possible, a referral was placed in the emergency department, they should contact you to help arrange an appointment within the next 2-3 business days.

## 2025-01-14 NOTE — ED TRIAGE NOTES
Triage Assessment (Adult)       Row Name 01/14/25 1140          Triage Assessment    Airway WDL WDL        Respiratory WDL    Respiratory WDL WDL        Skin Circulation/Temperature WDL    Skin Circulation/Temperature WDL WDL        Cardiac WDL    Cardiac WDL WDL        Peripheral/Neurovascular WDL    Peripheral Neurovascular WDL WDL        Cognitive/Neuro/Behavioral WDL    Cognitive/Neuro/Behavioral WDL WDL

## 2025-01-14 NOTE — ED PROVIDER NOTES
"ED Provider Note  [facility]      History     Chief Complaint   Patient presents with    Knee Pain     Leg pain from knee to ankle      HPI  Diane Chamberlain is a 65 year old female with a history of right knee osteoarthritis, prediabetes who presents to the emergency department for right knee pain.  [employer]  utilized via phone.  Patient has been having pain in her right knee that has been worsening for the last 2 weeks and began after waking up 1 day. This is a new type of pain for her.  Patient previously had pain walking up and down stairs, however pain is currently present with walking, standing, and lying down.  Patient denies skin issues.  Denies fall.  Denies hip or lower back pain.  Denies numbness weakness or tingling.  Patient has been taking Tylenol.  Patient is otherwise healthy.  Patient has knee brace.  No fever chills, night sweats, weight loss.    Past Medical History  No past medical history on file.  Past Surgical History:   Procedure Laterality Date     SECTION       acetaminophen (TYLENOL) 500 MG tablet  ibuprofen (ADVIL/MOTRIN) 200 MG tablet  alum & mag hydroxide-simethicone (MAALOX  ES) 400-400-40 MG/5ML SUSP suspension  cyclobenzaprine (FLEXERIL) 10 MG tablet  gabapentin (NEURONTIN) 100 MG capsule  traMADol (ULTRAM) 50 MG tablet      No Known Allergies  Family History  No family history on file.  Social History   Social History     Tobacco Use    Smoking status: Never    Smokeless tobacco: Never   Substance Use Topics    Alcohol use: No    Drug use: No      A medically appropriate review of systems was performed with pertinent positives and negatives noted in the HPI, and all other systems negative.    Physical Exam   BP: 125/76  Pulse: 69  Temp: 97.3  F (36.3  C)  Resp: 14  Height: 157.5 cm (5' 2\")  Weight: 103.4 kg (228 lb)  SpO2: 100 %  Physical Exam  Vitals and nursing note reviewed.   Constitutional:       General: She is not in acute distress.   "   Appearance: Normal appearance. She is not diaphoretic.   HENT:      Head: Atraumatic.      Mouth/Throat:      Mouth: Mucous membranes are moist.   Eyes:      General: No scleral icterus.     Conjunctiva/sclera: Conjunctivae normal.   Cardiovascular:      Rate and Rhythm: Normal rate.      Heart sounds: Normal heart sounds.   Pulmonary:      Effort: No respiratory distress.      Breath sounds: Normal breath sounds.   Abdominal:      General: Abdomen is flat.   Musculoskeletal:      Cervical back: Neck supple.      Right knee: No swelling, effusion or erythema. Tenderness present over the medial joint line. No patellar tendon tenderness.      Comments: Patient complains of diffuse knee pain but is most tender along the medial aspect of the knee.  There is no definite effusion, there is subtle warmth, no erythema.  She can flex and extend the knee fully.  Distal circulation motor or sensory normal.   Skin:     General: Skin is warm.      Findings: No rash.   Neurological:      Mental Status: She is alert.           ED Course, Procedures, & Data      Procedures                Results for orders placed or performed during the hospital encounter of 01/14/25   XR Knee Right 3 Views     Status: None    Narrative    EXAM: XR KNEE RIGHT 3 VIEWS  LOCATION: [facility]  DATE: 1/14/2025    INDICATION: pain  COMPARISON: None.      Impression    IMPRESSION: Small right knee effusion. Mild medial and moderate patellofemoral compartment osteoarthritis with joint space narrowing and osteophyte formation. Transverse lucency through the osteophyte along the superior patella which could represent a   osteophyte fracture or fragmentation. Normal alignment. Well-corticated ossific fragment along the medial aspect of the tibial plateau which is likely sequelae of prior injury.     Medications   ibuprofen (ADVIL/MOTRIN) tablet 600 mg (600 mg Oral $Given 1/14/25 1237)     Labs Ordered and  Resulted from Time of ED Arrival to Time of ED Departure - No data to display  XR Knee Right 3 Views   Final Result   IMPRESSION: Small right knee effusion. Mild medial and moderate patellofemoral compartment osteoarthritis with joint space narrowing and osteophyte formation. Transverse lucency through the osteophyte along the superior patella which could represent a    osteophyte fracture or fragmentation. Normal alignment. Well-corticated ossific fragment along the medial aspect of the tibial plateau which is likely sequelae of prior injury.             Critical care was not performed.     Medical Decision Making  The patient's presentation was of moderate complexity (a chronic illness mild to moderate exacerbation, progression, or side effect of treatment).    The patient's evaluation involved:  review of external note(s) from 1 sources (review of documentation from prior ED visit related to low back pain and sciatica)  ordering and/or review of 1 test(s) in this encounter (see separate area of note for details)  independent interpretation of testing performed by another health professional (plain films of knee images personally reviewed and reviewed radiologist interpretation)    The patient's management necessitated moderate risk (prescription drug management including medications given in the ED).    Assessment & Plan    A 65-year-old male with reported history of right knee osteoarthritis is presenting with 2 weeks of worsening nontraumatic right knee pain.  Differential diagnosis includes exacerbation of osteoarthritis, patellofemoral syndrome, referred pain from lumbar radiculopathy, viral synovitis, bursitis, patellar or quadriceps tendinitis, less likely septic joint, gout, pseudogout, autoimmune inflammatory arthritis such as rheumatoid arthritis.  Exam and vitals are normal.  Patient does not appear systemically ill or in acute distress.  There is no obvious swelling of the knee, there is a subtle  warmth but no erythema and no definite effusion.  She is tender mostly along the medial aspect of the patella and the right medial joint line.  Physical exam otherwise unremarkable.  Treated for pain and imaging obtained, pain improved with ibuprofen.  Her imaging shows significant degenerative changes of the knee consistent with acute on chronic osteoarthritis.  She is appropriate for symptomatic treatment and further referral as she works on her feet and is having progressively worsening problems with the knee, I will refer to sports medicine.  We discussed the indications for emergency department return and follow-up.  Stable for discharge.        I have reviewed the nursing notes. I have reviewed the findings, diagnosis, plan and need for follow up with the patient.    Discharge Medication List as of 1/14/2025  3:08 PM        START taking these medications    Details   acetaminophen (TYLENOL) 500 MG tablet Take 2 tablets (1,000 mg) by mouth 3 times daily for 20 days., Disp-60 tablet, R-1, E-Prescribe      ibuprofen (ADVIL/MOTRIN) 200 MG tablet Take 3 tablets (600 mg) by mouth every 6 hours as needed for moderate pain., Disp-30 tablet, R-0, E-Prescribe             Final diagnoses:   Primary osteoarthritis of right knee   I, Cas Ramirez, am serving as a trained medical scribe to document services personally performed by Tae Swanson MD, based to the provider's statements to me.     I, Tae Swanson MD, was physically present and have reviewed and verified the accuracy of this note documented by Cas Ramirez.     Tae Swanson MD  [facility] EMERGENCY DEPARTMENT  1/14/2025     Tae Swanson MD  01/14/25 6183

## 2025-01-15 ENCOUNTER — PATIENT OUTREACH (OUTPATIENT)
Dept: CARE COORDINATION | Facility: CLINIC | Age: 65
End: 2025-01-15
Payer: MEDICAID

## 2025-01-20 NOTE — PROGRESS NOTES
"Sports Medicine Clinic           ASSESSMENT and PLAN:     Diane was seen today for pain.    Diagnoses and all orders for this visit:    Acute on Chronic pain of right knee  Primary osteoarthritis of right knee  Acute worsening of chronic knee pain without incident consistent with OA, especially of the PF compartment. We discussed treatment options including activity modification, PT, NSAIDs, CSI, consideration of TKA. She would like to proceed with a CSI and tolerated this well today as below.   -     Large Joint Injection: R knee joint  -     follow up as needed    Return sooner if develops new or worsening symptoms.    Options for treatment and/or follow-up care were reviewed with the patient was actively involved in the decision making process. Patient verbalized understanding and was in agreement with the plan.      Twyla Draper MD, [facility]  Primary Care Sports Medicine             SUBJECTIVE       Diane Chamberlain is a 65 year old female presenting to clinic today with a chief complaint of right knee pain, osteoarthritis, referred by Dr. Swanson.    Onset: 3 week(s) ago. Reports insidious onset without acute precipitating event.  Location of Pain: right knee over anterior knee, deep pain. Sometimes over the posterior knee  Rating of Pain at worst: 10/10  Rating of Pain Currently: 5/10  Worsened by: Standing, ADLs  Better with: Sitting, heat, stretching, massaging the muscles around the knee, utilizing knee sleeve/brace  Treatments tried: heat, Tylenol, stretching, self massage  Associated symptoms: swelling and locking when siting for long periods of time, \"cracking\"  Orthopedic history: NO  Relevant surgical history: NO  Social history: social history: works at cabin cleaner    It is the worse when she is getting off the floor. The pain will move around, sometimes it is more medial, sometimes anterior.       PMH, Medications and Allergies were reviewed and updated as needed.    ROS:  As noted above otherwise " negative.    There is no problem list on file for this patient.      Current Outpatient Medications   Medication Sig Dispense Refill    acetaminophen (TYLENOL) 500 MG tablet Take 2 tablets (1,000 mg) by mouth 3 times daily for 20 days. 60 tablet 1    alum & mag hydroxide-simethicone (MAALOX  ES) 400-400-40 MG/5ML SUSP suspension Take 15 mLs by mouth every 6 hours as needed for indigestion 355 mL 0    cyclobenzaprine (FLEXERIL) 10 MG tablet Take 1 tablet (10 mg) by mouth 3 times daily as needed for muscle spasms. 15 tablet 0    gabapentin (NEURONTIN) 100 MG capsule Take 1 capsule (100 mg) by mouth 3 times daily. 15 capsule 0    ibuprofen (ADVIL/MOTRIN) 200 MG tablet Take 3 tablets (600 mg) by mouth every 6 hours as needed for moderate pain. 30 tablet 0    traMADol (ULTRAM) 50 MG tablet Take 1 tablet (50 mg) by mouth every 6 hours as needed for moderate to severe pain 10 tablet 0            OBJECTIVE:       Vitals: There were no vitals filed for this visit.  BMI: There is no height or weight on file to calculate BMI.    Gen:  Well nourished and in no acute distress  HEENT: Extraocular movement intact  Neck: Supple  Pulm:  Breathing Comfortably. No increased respiratory effort.  Psych: Euthymic. Appropriately answers questions    MSK:   RIGHT KNEE  Inspection:    Normal alignment;mild  edema, erythema, or ecchymosis present  Palpation:    Tender about the lateral patellar facet, medial patellar facet, lateral joint line, and medial joint line. Remainder of bony and ligamentous landmarks are nontender.    Mild effusion is present    Patellofemoral crepitus is Present  Range of Motion:     00 extension to 1200 flexion  Strength:    Quadriceps 5/5, painful and hamstrings 5/5    Extensor mechanism intact    Imaging was personally reviewed and interpreted by me.   EXAM: XR KNEE RIGHT 3 VIEWS  LOCATION: [facility]  DATE: 1/14/2025     INDICATION: pain  COMPARISON: None.                                                                       IMPRESSION: Small right knee effusion. Mild medial and moderate patellofemoral compartment osteoarthritis with joint space narrowing and osteophyte formation. Transverse lucency through the osteophyte along the superior patella which could represent a   osteophyte fracture or fragmentation. Normal alignment. Well-corticated ossific fragment along the medial aspect of the tibial plateau which is likely sequelae of prior injury.    Large Joint Injection: R knee joint    Date/Time: 1/21/2025 2:42 PM    Performed by: Twyla Draper MD  Authorized by: Twyla Draper MD    Indications:  Pain and osteoarthritis  Needle Size:  22 G  Guidance: landmark guided    Approach:  Superolateral  Location:  Knee      Medications:  40 mg triamcinolone 40 MG/ML; 4 mL lidocaine (PF) 1 %  Outcome:  Tolerated well, no immediate complications  Procedure discussed: discussed risks, benefits, and alternatives    Consent Given by:  Patient  Timeout: timeout called immediately prior to procedure    Prep: patient was prepped and draped in usual sterile fashion

## 2025-01-21 ENCOUNTER — OFFICE VISIT (OUTPATIENT)
Dept: ORTHOPEDICS | Facility: CLINIC | Age: 65
End: 2025-01-21
Attending: FAMILY MEDICINE
Payer: MEDICAID

## 2025-01-21 DIAGNOSIS — M17.11 PRIMARY OSTEOARTHRITIS OF RIGHT KNEE: ICD-10-CM

## 2025-01-21 DIAGNOSIS — G89.29 CHRONIC PAIN OF RIGHT KNEE: Primary | ICD-10-CM

## 2025-01-21 DIAGNOSIS — M25.561 CHRONIC PAIN OF RIGHT KNEE: Primary | ICD-10-CM

## 2025-01-21 PROCEDURE — 99213 OFFICE O/P EST LOW 20 MIN: CPT | Mod: 25 | Performed by: STUDENT IN AN ORGANIZED HEALTH CARE EDUCATION/TRAINING PROGRAM

## 2025-01-21 PROCEDURE — 20610 DRAIN/INJ JOINT/BURSA W/O US: CPT | Mod: RT | Performed by: STUDENT IN AN ORGANIZED HEALTH CARE EDUCATION/TRAINING PROGRAM

## 2025-01-21 RX ORDER — LIDOCAINE HYDROCHLORIDE 10 MG/ML
4 INJECTION, SOLUTION EPIDURAL; INFILTRATION; INTRACAUDAL; PERINEURAL
Status: COMPLETED | OUTPATIENT
Start: 2025-01-21 | End: 2025-01-21

## 2025-01-21 RX ORDER — TRIAMCINOLONE ACETONIDE 40 MG/ML
40 INJECTION, SUSPENSION INTRA-ARTICULAR; INTRAMUSCULAR
Status: COMPLETED | OUTPATIENT
Start: 2025-01-21 | End: 2025-01-21

## 2025-01-21 RX ADMIN — LIDOCAINE HYDROCHLORIDE 4 ML: 10 INJECTION, SOLUTION EPIDURAL; INFILTRATION; INTRACAUDAL; PERINEURAL at 14:42

## 2025-01-21 RX ADMIN — TRIAMCINOLONE ACETONIDE 40 MG: 40 INJECTION, SUSPENSION INTRA-ARTICULAR; INTRAMUSCULAR at 14:42

## 2025-01-21 NOTE — LETTER
"  1/21/2025      RE: Diane Chamberlain  [address]  [place] [address]     Dear Colleague,    Thank you for referring your patient, Diane Chamberlain, to the [facility] [place]. Please see a copy of my visit note below.    Sports Medicine Clinic           ASSESSMENT and PLAN:     Diane was seen today for pain.    Diagnoses and all orders for this visit:    Acute on Chronic pain of right knee  Primary osteoarthritis of right knee  Acute worsening of chronic knee pain without incident consistent with OA, especially of the PF compartment. We discussed treatment options including activity modification, PT, NSAIDs, CSI, consideration of TKA. She would like to proceed with a CSI and tolerated this well today as below.   -     Large Joint Injection: R knee joint  -     follow up as needed    Return sooner if develops new or worsening symptoms.    Options for treatment and/or follow-up care were reviewed with the patient was actively involved in the decision making process. Patient verbalized understanding and was in agreement with the plan.      Twyla Draper MD, [facility]  Primary Care Sports Medicine             SUBJECTIVE       Diane Chamberlain is a 65 year old female presenting to clinic today with a chief complaint of right knee pain, osteoarthritis, referred by Dr. Swanson.    Onset: 3 week(s) ago. Reports insidious onset without acute precipitating event.  Location of Pain: right knee over anterior knee, deep pain. 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 The pain will move around, sometimes it is more medial, sometimes anterior.       PMH, Medications and Allergies were reviewed and updated as needed.    ROS:  As noted above otherwise negative.    There is no problem list on file for this patient.      Current Outpatient Medications   Medication Sig Dispense Refill     acetaminophen (TYLENOL) 500 MG tablet Take 2 tablets (1,000 mg) by mouth 3 times daily for 20 days. 60 tablet 1     alum & mag hydroxide-simethicone (MAALOX  ES) 400-400-40 MG/5ML SUSP suspension Take 15 mLs by mouth every 6 hours as needed for indigestion 355 mL 0     cyclobenzaprine (FLEXERIL) 10 MG tablet Take 1 tablet (10 mg) by mouth 3 times daily as needed for muscle spasms. 15 tablet 0     gabapentin (NEURONTIN) 100 MG capsule Take 1 capsule (100 mg) by mouth 3 times daily. 15 capsule 0     ibuprofen (ADVIL/MOTRIN) 200 MG tablet Take 3 tablets (600 mg) by mouth every 6 hours as needed for moderate pain. 30 tablet 0     traMADol (ULTRAM) 50 MG tablet Take 1 tablet (50 mg) by mouth every 6 hours as needed for moderate to severe pain 10 tablet 0            OBJECTIVE:       Vitals: There were no vitals filed for this visit.  BMI: There is no height or weight on file to calculate BMI.    Gen:  Well nourished and in no acute distress  HEENT: Extraocular movement intact  Neck: Supple  Pulm:  Breathing Comfortably. No increased respiratory effort.  Psych: Euthymic. Appropriately answers questions    MSK:   RIGHT KNEE  Inspection:    Normal alignment;mild  edema, erythema, or ecchymosis present  Palpation:    Tender about the lateral patellar facet, medial patellar facet, lateral joint line, and medial joint line. Remainder of bony and ligamentous landmarks are nontender.    Mild effusion is present    Patellofemoral crepitus is Present  Range of Motion:     00 extension to 1200 flexion  Strength:    Quadriceps 5/5,  painful and hamstrings 5/5    Extensor mechanism intact    Imaging was personally reviewed and interpreted by me.   EXAM: XR KNEE RIGHT 3 VIEWS  LOCATION: [facility]  DATE: 1/14/2025     INDICATION: pain  COMPARISON: None.                                                                      IMPRESSION: Small right knee effusion. Mild medial and moderate patellofemoral compartment osteoarthritis with joint space narrowing and osteophyte formation. Transverse lucency through the osteophyte along the superior patella which could represent a   osteophyte fracture or fragmentation. Normal alignment. Well-corticated ossific fragment along the medial aspect of the tibial plateau which is likely sequelae of prior injury.    Large Joint Injection: R knee joint    Date/Time: 1/21/2025 2:42 PM    Performed by: Twyla Draper MD  Authorized by: Twyla Draper MD    Indications:  Pain and osteoarthritis  Needle Size:  22 G  Guidance: landmark guided    Approach:  Superolateral  Location:  Knee      Medications:  40 mg triamcinolone 40 MG/ML; 4 mL lidocaine (PF) 1 %  Outcome:  Tolerated well, no immediate complications  Procedure discussed: discussed risks, benefits, and alternatives    Consent Given by:  Patient  Timeout: timeout called immediately prior to procedure    Prep: patient was prepped and draped in usual sterile fashion          Again, thank you for allowing me to participate in the care of your patient.      Sincerely,    Twyla Draper MD

## 2025-01-21 NOTE — NURSING NOTE
[address] [address]  Dept: [phone]  ______________________________________________________________________________    Patient: Diane Chamberlain   : 1960   MRN: [id number]   2025    INVASIVE PROCEDURE SAFETY CHECKLIST    Date: 2025     Procedure:Right knee CSI Kenalog  Patient Name: Diane Chamberlain  MRN: [id number]  YOB: 1960    Action: Complete sections as appropriate. Any discrepancy results in a HARD COPY until resolved.     PRE PROCEDURE:  Patient ID verified with 2 identifiers (name and  or MRN): Yes  Procedure and site verified with patient/designee (when able): Yes  Accurate consent documentation in medical record: Yes  H&P (or appropriate assessment) documented in medical record: Yes  H&P must be up to 20 days prior to procedure and updates within 24 hours of procedure as applicable: NA  Relevant diagnostic and radiology test results appropriately labeled and displayed as applicable: Yes  Procedure site(s) marked with provider initials: NA    TIMEOUT:  Time-Out performed immediately prior to starting procedure, including verbal and active participation of all team members addressing the following:Yes  * Correct patient identify  * Confirmed that the correct side and site are marked  * An accurate procedure consent form  * Agreement on the procedure to be done  * Correct patient position  * Relevant images and results are properly labeled and appropriately displayed  * The need to administer antibiotics or fluids for irrigation purposes during the procedure as applicable   * Safety precautions based on patient history or medication use    DURING PROCEDURE: Verification of correct person, site, and procedures any time the responsibility for care of the patient is transferred to another member of the care team.       Prior to injection, verified patient identity using patient's name and date of  birth.  Due to injection administration, patient instructed to remain in clinic for 15 minutes  afterwards, and to report any adverse reaction to me immediately.    Joint injection was performed.      Drug Amount Wasted:  Yes: 1 mg/ml   Vial/Syringe: Single dose vial  Expiration Date:  07/2028      Charo Molina ATC  January 21, 2025

## 2025-01-21 NOTE — LETTER
2025    Diane Chamberlain   1960        To Whom it May Concern;    Please excuse Diane Chamberlain from work due to knee injury from 2025 until 24. She may return to work on 25.    Sincerely,        Twyla Draper MD

## 2025-02-04 ENCOUNTER — OFFICE VISIT (OUTPATIENT)
Dept: ORTHOPEDICS | Facility: CLINIC | Age: 65
End: 2025-02-04
Payer: COMMERCIAL

## 2025-02-04 ENCOUNTER — DOCUMENTATION ONLY (OUTPATIENT)
Dept: ORTHOPEDICS | Facility: CLINIC | Age: 65
End: 2025-02-04

## 2025-02-04 DIAGNOSIS — M65.341 TRIGGER RING FINGER OF RIGHT HAND: Primary | ICD-10-CM

## 2025-02-04 DIAGNOSIS — M17.11 PRIMARY OSTEOARTHRITIS OF RIGHT KNEE: ICD-10-CM

## 2025-02-04 PROCEDURE — 20550 NJX 1 TENDON SHEATH/LIGAMENT: CPT | Mod: F6 | Performed by: STUDENT IN AN ORGANIZED HEALTH CARE EDUCATION/TRAINING PROGRAM

## 2025-02-04 PROCEDURE — 99213 OFFICE O/P EST LOW 20 MIN: CPT | Mod: 25 | Performed by: STUDENT IN AN ORGANIZED HEALTH CARE EDUCATION/TRAINING PROGRAM

## 2025-02-04 RX ORDER — TRIAMCINOLONE ACETONIDE 40 MG/ML
20 INJECTION, SUSPENSION INTRA-ARTICULAR; INTRAMUSCULAR
Status: COMPLETED | OUTPATIENT
Start: 2025-02-04 | End: 2025-02-04

## 2025-02-04 RX ORDER — DICLOFENAC SODIUM 75 MG/1
75 TABLET, DELAYED RELEASE ORAL 2 TIMES DAILY PRN
Qty: 60 TABLET | Refills: 0 | Status: SHIPPED | OUTPATIENT
Start: 2025-02-04

## 2025-02-04 RX ORDER — LIDOCAINE HYDROCHLORIDE 10 MG/ML
0.5 INJECTION, SOLUTION EPIDURAL; INFILTRATION; INTRACAUDAL; PERINEURAL
Status: COMPLETED | OUTPATIENT
Start: 2025-02-04 | End: 2025-02-04

## 2025-02-04 RX ADMIN — LIDOCAINE HYDROCHLORIDE 0.5 ML: 10 INJECTION, SOLUTION EPIDURAL; INFILTRATION; INTRACAUDAL; PERINEURAL at 16:07

## 2025-02-04 RX ADMIN — TRIAMCINOLONE ACETONIDE 20 MG: 40 INJECTION, SUSPENSION INTRA-ARTICULAR; INTRAMUSCULAR at 16:07

## 2025-02-04 NOTE — NURSING NOTE
[address] [address]  Dept: [phone]  ______________________________________________________________________________    Patient: Diane Chamberlain   : 1960   MRN: [id number]   2025    INVASIVE PROCEDURE SAFETY CHECKLIST    Date: 2025   Procedure: Right ring trigger finger CSI   Patient Name: Diane Chamberlain  MRN: [id number]  YOB: 1960    Action: Complete sections as appropriate. Any discrepancy results in a HARD COPY until resolved.     PRE PROCEDURE:  Patient ID verified with 2 identifiers (name and  or MRN): Yes  Procedure and site verified with patient/designee (when able): Yes  Accurate consent documentation in medical record: Yes  H&P (or appropriate assessment) documented in medical record: Yes  H&P must be up to 20 days prior to procedure and updates within 24 hours of procedure as applicable: NA  Relevant diagnostic and radiology test results appropriately labeled and displayed as applicable: Yes  Procedure site(s) marked with provider initials: NA    TIMEOUT:  Time-Out performed immediately prior to starting procedure, including verbal and active participation of all team members addressing the following:Yes  * Correct patient identify  * Confirmed that the correct side and site are marked  * An accurate procedure consent form  * Agreement on the procedure to be done  * Correct patient position  * Relevant images and results are properly labeled and appropriately displayed  * The need to administer antibiotics or fluids for irrigation purposes during the procedure as applicable   * Safety precautions based on patient history or medication use    DURING PROCEDURE: Verification of correct person, site, and procedures any time the responsibility for care of the patient is transferred to another member of the care team.       Prior to injection, verified patient identity using patient's name and date of  birth.  Due to injection administration, patient instructed to remain in clinic for 15 minutes  afterwards, and to report any adverse reaction to me immediately.    Tendon sheath injection was performed.     Drug Amount Wasted:  Yes: 4.5 mg/ml lidocaine  0.5 kenalog   Vial/Syringe: Single dose vial  Expiration Date:  07/28 lido  09/26 dennis Borrero [facility]  February 4, 2025

## 2025-02-04 NOTE — LETTER
2025      Diane Chamberlain   1960        To Whom it May Concern;    Please excuse Diane Chamberlain from work due to knee injury from 2025 until 25. She may return to work on 25, however she is unable to climb stairs due to chronic knee pain.    Sincerely,        Twyla Draper MD

## 2025-02-04 NOTE — PROGRESS NOTES
Sports Medicine Clinic           ASSESSMENT and PLAN:     Diane was seen today for pain and follow up.    Diagnoses and all orders for this visit:    Trigger ring finger of right hand  Long standing trigger finger, with some improvement but not resolution with oval 8 and topical NSAIDs. Interested in CSI which was performed below without issue.        -      injection as below    Primary osteoarthritis of right knee  PF OA of the right knee improved with CSI recently but reaggrevated by doing stairs at work. We discussed period of rest and oral NSAIDs followed by return to work with continued avoidance of stairs in addition to PT  -     Physical Therapy  Referral; Future  -     diclofenac (VOLTAREN) 75 MG EC tablet; Take 1 tablet (75 mg) by mouth 2 times daily as needed for moderate pain.        Return sooner if develops new or worsening symptoms.    Options for treatment and/or follow-up care were reviewed with the patient was actively involved in the decision making process. Patient verbalized understanding and was in agreement with the plan.    Twyla Draper MD, [facility]  Primary Care Sports Medicine           SUBJECTIVE       Diane Chamberlain is a 65 year old female presenting to clinic today for follow up of right knee pain and right ring finger.    Patient was last seen on 1/21/25 for the right knee and 11/19/24 for the finger.    Since their last visit patient reports that she was wearing the oval 8 brace but she does still get locking. She did not go to hand therapy. Would like to do an injection for her trigger finger.     Regarding the knee, she had an injection on 1/21/25. She did think this was helpful for her pain but then at work this week she had to go upstairs and her pain increased and she had to miss work last week. Stairs are what are the most challenging for her.     Did well with the injection at first, but then went back to work on Saturday and climbed stairs and the pain was worse,  back to how it was prior to the injection.      PMH, Medications and Allergies were reviewed and updated as needed.    ROS:  As noted above otherwise negative.    There is no problem list on file for this patient.      Current Outpatient Medications   Medication Sig Dispense Refill    alum & mag hydroxide-simethicone (MAALOX  ES) 400-400-40 MG/5ML SUSP suspension Take 15 mLs by mouth every 6 hours as needed for indigestion 355 mL 0    cyclobenzaprine (FLEXERIL) 10 MG tablet Take 1 tablet (10 mg) by mouth 3 times daily as needed for muscle spasms. 15 tablet 0    diclofenac (VOLTAREN) 75 MG EC tablet Take 1 tablet (75 mg) by mouth 2 times daily as needed for moderate pain. 60 tablet 0    gabapentin (NEURONTIN) 100 MG capsule Take 1 capsule (100 mg) by mouth 3 times daily. 15 capsule 0    ibuprofen (ADVIL/MOTRIN) 200 MG tablet Take 3 tablets (600 mg) by mouth every 6 hours as needed for moderate pain. 30 tablet 0    traMADol (ULTRAM) 50 MG tablet Take 1 tablet (50 mg) by mouth every 6 hours as needed for moderate to severe pain 10 tablet 0            OBJECTIVE:       Vitals: There were no vitals filed for this visit.  BMI: There is no height or weight on file to calculate BMI.    Gen:  Well nourished and in no acute distress  HEENT: Extraocular movement intact  Neck: Supple  Pulm:  Breathing Comfortably. No increased respiratory effort.  Psych: Euthymic. Appropriately answers questions    MSK:   Slight swelling over the 4th MCP on the volar side. Able to flex and extend actively at the MCP, PIP and DIP, but lacking full flexion into fist. With volar pressure over the MCP able to fully flex and then palpable trigger reproduced.        Hand / Upper Extremity Injection/Arthrocentesis: R index A1    Date/Time: 2/4/2025 4:07 PM    Performed by: Twyla Draper MD  Authorized by: Twyla Draper MD    Indications:  Therapeutic  Needle Size:  25 G  Guidance: landmark    Approach:  Volar  Condition: trigger finger    Location:   Index finger    Site:  R index A1  Medications:  20 mg triamcinolone 40 MG/ML; 0.5 mL lidocaine (PF) 1 %  Outcome:  Tolerated well, no immediate complications  Procedure discussed: discussed risks, benefits, and alternatives    Consent Given by:  Patient  Timeout: timeout called immediately prior to procedure    Prep: patient was prepped and draped in usual sterile fashion

## 2025-02-04 NOTE — PROGRESS NOTES
Received Completed forms Yes   Faxed Forms Faxed To: [employer]  Fax Number: [phone]   Sent to HIM (Date) 2/4/25

## 2025-02-04 NOTE — PATIENT INSTRUCTIONS
[facility] Outpatient Physical Therapy Locations    To schedule an appointment please call our scheduling department at [phone]    To fax a referral to be scheduled fax to [phone]    [place]: [address], [facility]: [address] [facility]: [address], [facility]: [address], [facility]: [address], [facility]: [address] [facility]: [address] [place], [place]  [place]: [address] [facility] , [address], [facility]: [address] [facility] , [address], [facility]: [address] [facility]: [address], [facility]: [address], [facility]: [address], [facility]: [address], [facility]: [address] [facility] [facility]: [address], [facility]: [facility]: [address] [facility], [address], [facility]: [address], [facility]: [address], [place], [place]  [place]: [address] [facility]: [address] [place] [address]: [address], [facility]: [address] [facility] [address], [place]  [place]: [address], [facility]: [address], [facility]: [address] [facility] [place], [place]  [facility]: [address] [place], [place]  [facility]: [address] [place] [address]  [place]: [address] [facility], [address], [facility] [address] [facility],  [facility]: [address] [facility] [place], [place]

## 2025-02-04 NOTE — LETTER
2/4/2025      RE: Diane Chamberlain  [address]  [facility] [id number]     Dear Colleague,    Thank you for referring your patient, Diane Chamberlain, to the [facility] [place]. Please see a copy of my visit note below.      Sports Medicine Clinic           ASSESSMENT and PLAN:     Diane was seen today for pain and follow up.    Diagnoses and all orders for this visit:    Trigger ring finger of right hand  Long standing trigger finger, with some improvement but not resolution with oval 8 and topical NSAIDs. Interested in CSI which was performed below without issue.        -      injection as below    Primary osteoarthritis of right knee  PF OA of the right knee improved with CSI recently but reaggrevated by doing stairs at work. We discussed period of rest and oral NSAIDs followed by return to work with continued avoidance of stairs in addition to PT  -     Physical Therapy  Referral; Future  -     diclofenac (VOLTAREN) 75 MG EC tablet; Take 1 tablet (75 mg) by mouth 2 times daily as needed for moderate pain.        Return sooner if develops new or worsening symptoms.    Options for treatment and/or follow-up care were reviewed with the patient was actively involved in the decision making process. Patient verbalized understanding and was in agreement with the plan.    Twyla Draper MD, [facility]  Primary Care Sports Medicine           SUBJECTIVE       Diane Chamberlain is a 65 year old female presenting to clinic today for follow up of right knee pain and right ring finger.    Patient was last seen on 1/21/25 for the right knee and 11/19/24 for the finger.    Since their last visit patient reports that she was wearing the oval 8 brace but she does still get locking. She did not go to hand therapy. Would like to do an injection for her trigger finger.     Regarding the knee, she had an injection on 1/21/25. She did think this was helpful for her pain but then at work  this week she had to go upstairs and her pain increased and she had to miss work last week. Stairs are what are the most challenging for her.     Did well with the injection at first, but then went back to work on Saturday and climbed stairs and the pain was worse, back to how it was prior to the injection.      PMH, Medications and Allergies were reviewed and updated as needed.    ROS:  As noted above otherwise negative.    There is no problem list on file for this patient.      Current Outpatient Medications   Medication Sig Dispense Refill     alum & mag hydroxide-simethicone (MAALOX  ES) 400-400-40 MG/5ML SUSP suspension Take 15 mLs by mouth every 6 hours as needed for indigestion 355 mL 0     cyclobenzaprine (FLEXERIL) 10 MG tablet Take 1 tablet (10 mg) by mouth 3 times daily as needed for muscle spasms. 15 tablet 0     diclofenac (VOLTAREN) 75 MG EC tablet Take 1 tablet (75 mg) by mouth 2 times daily as needed for moderate pain. 60 tablet 0     gabapentin (NEURONTIN) 100 MG capsule Take 1 capsule (100 mg) by mouth 3 times daily. 15 capsule 0     ibuprofen (ADVIL/MOTRIN) 200 MG tablet Take 3 tablets (600 mg) by mouth every 6 hours as needed for moderate pain. 30 tablet 0     traMADol (ULTRAM) 50 MG tablet Take 1 tablet (50 mg) by mouth every 6 hours as needed for moderate to severe pain 10 tablet 0            OBJECTIVE:       Vitals: There were no vitals filed for this visit.  BMI: There is no height or weight on file to calculate BMI.    Gen:  Well nourished and in no acute distress  HEENT: Extraocular movement intact  Neck: Supple  Pulm:  Breathing Comfortably. No increased respiratory effort.  Psych: Euthymic. Appropriately answers questions    MSK:   Slight swelling over the 4th MCP on the volar side. Able to flex and extend actively at the MCP, PIP and DIP, but lacking full flexion into fist. With volar pressure over the MCP able to fully flex and then palpable trigger reproduced.        Hand / Upper  Extremity Injection/Arthrocentesis: R index A1    Date/Time: 2/4/2025 4:07 PM    Performed by: Twyla Draper MD  Authorized by: Twyla Draper MD    Indications:  Therapeutic  Needle Size:  25 G  Guidance: landmark    Approach:  Volar  Condition: trigger finger    Location:  Index finger    Site:  R index A1  Medications:  20 mg triamcinolone 40 MG/ML; 0.5 mL lidocaine (PF) 1 %  Outcome:  Tolerated well, no immediate complications  Procedure discussed: discussed risks, benefits, and alternatives    Consent Given by:  Patient  Timeout: timeout called immediately prior to procedure    Prep: patient was prepped and draped in usual sterile fashion          Again, thank you for allowing me to participate in the care of your patient.      Sincerely,    Twyla Draper MD

## 2025-02-08 ENCOUNTER — HEALTH MAINTENANCE LETTER (OUTPATIENT)
Age: 65
End: 2025-02-08

## 2025-03-04 ENCOUNTER — DOCUMENTATION ONLY (OUTPATIENT)
Dept: ORTHOPEDICS | Facility: CLINIC | Age: 65
End: 2025-03-04
Payer: COMMERCIAL

## 2025-03-04 NOTE — PROGRESS NOTES
Received Completed forms Yes   Faxed Forms Faxed To: [employer]  Fax Number: [phone]   Sent to HIM (Date) 3/4/25

## (undated) RX ORDER — LIDOCAINE HYDROCHLORIDE 10 MG/ML
INJECTION, SOLUTION EPIDURAL; INFILTRATION; INTRACAUDAL; PERINEURAL
Status: DISPENSED
Start: 2025-01-21

## (undated) RX ORDER — TRIAMCINOLONE ACETONIDE 40 MG/ML
INJECTION, SUSPENSION INTRA-ARTICULAR; INTRAMUSCULAR
Status: DISPENSED
Start: 2025-02-04

## (undated) RX ORDER — TRIAMCINOLONE ACETONIDE 40 MG/ML
INJECTION, SUSPENSION INTRA-ARTICULAR; INTRAMUSCULAR
Status: DISPENSED
Start: 2025-01-21

## (undated) RX ORDER — LIDOCAINE HYDROCHLORIDE 10 MG/ML
INJECTION, SOLUTION EPIDURAL; INFILTRATION; INTRACAUDAL; PERINEURAL
Status: DISPENSED
Start: 2025-02-04